# Patient Record
Sex: FEMALE | Race: BLACK OR AFRICAN AMERICAN | NOT HISPANIC OR LATINO | URBAN - METROPOLITAN AREA
[De-identification: names, ages, dates, MRNs, and addresses within clinical notes are randomized per-mention and may not be internally consistent; named-entity substitution may affect disease eponyms.]

---

## 2020-10-17 ENCOUNTER — INPATIENT (INPATIENT)
Facility: HOSPITAL | Age: 70
LOS: 2 days | Discharge: HOME | End: 2020-10-20
Attending: INTERNAL MEDICINE | Admitting: INTERNAL MEDICINE
Payer: MEDICAID

## 2020-10-17 VITALS
SYSTOLIC BLOOD PRESSURE: 159 MMHG | DIASTOLIC BLOOD PRESSURE: 67 MMHG | TEMPERATURE: 98 F | OXYGEN SATURATION: 99 % | RESPIRATION RATE: 20 BRPM | HEART RATE: 90 BPM

## 2020-10-17 LAB
ALBUMIN SERPL ELPH-MCNC: 4.5 G/DL — SIGNIFICANT CHANGE UP (ref 3.5–5.2)
ALP SERPL-CCNC: 61 U/L — SIGNIFICANT CHANGE UP (ref 30–115)
ALT FLD-CCNC: 25 U/L — SIGNIFICANT CHANGE UP (ref 0–41)
ANION GAP SERPL CALC-SCNC: 9 MMOL/L — SIGNIFICANT CHANGE UP (ref 7–14)
AST SERPL-CCNC: 26 U/L — SIGNIFICANT CHANGE UP (ref 0–41)
BASOPHILS # BLD AUTO: 0.04 K/UL — SIGNIFICANT CHANGE UP (ref 0–0.2)
BASOPHILS NFR BLD AUTO: 0.6 % — SIGNIFICANT CHANGE UP (ref 0–1)
BILIRUB SERPL-MCNC: 0.3 MG/DL — SIGNIFICANT CHANGE UP (ref 0.2–1.2)
BUN SERPL-MCNC: 14 MG/DL — SIGNIFICANT CHANGE UP (ref 10–20)
CALCIUM SERPL-MCNC: 9.1 MG/DL — SIGNIFICANT CHANGE UP (ref 8.5–10.1)
CHLORIDE SERPL-SCNC: 101 MMOL/L — SIGNIFICANT CHANGE UP (ref 98–110)
CO2 SERPL-SCNC: 25 MMOL/L — SIGNIFICANT CHANGE UP (ref 17–32)
CREAT SERPL-MCNC: 0.8 MG/DL — SIGNIFICANT CHANGE UP (ref 0.7–1.5)
EOSINOPHIL # BLD AUTO: 0.01 K/UL — SIGNIFICANT CHANGE UP (ref 0–0.7)
EOSINOPHIL NFR BLD AUTO: 0.1 % — SIGNIFICANT CHANGE UP (ref 0–8)
GLUCOSE SERPL-MCNC: 132 MG/DL — HIGH (ref 70–99)
HCT VFR BLD CALC: 36.6 % — LOW (ref 37–47)
HGB BLD-MCNC: 12 G/DL — SIGNIFICANT CHANGE UP (ref 12–16)
IMM GRANULOCYTES NFR BLD AUTO: 0.4 % — HIGH (ref 0.1–0.3)
LYMPHOCYTES # BLD AUTO: 1.14 K/UL — LOW (ref 1.2–3.4)
LYMPHOCYTES # BLD AUTO: 15.9 % — LOW (ref 20.5–51.1)
MAGNESIUM SERPL-MCNC: 2.1 MG/DL — SIGNIFICANT CHANGE UP (ref 1.8–2.4)
MCHC RBC-ENTMCNC: 29 PG — SIGNIFICANT CHANGE UP (ref 27–31)
MCHC RBC-ENTMCNC: 32.8 G/DL — SIGNIFICANT CHANGE UP (ref 32–37)
MCV RBC AUTO: 88.4 FL — SIGNIFICANT CHANGE UP (ref 81–99)
MONOCYTES # BLD AUTO: 0.33 K/UL — SIGNIFICANT CHANGE UP (ref 0.1–0.6)
MONOCYTES NFR BLD AUTO: 4.6 % — SIGNIFICANT CHANGE UP (ref 1.7–9.3)
NEUTROPHILS # BLD AUTO: 5.64 K/UL — SIGNIFICANT CHANGE UP (ref 1.4–6.5)
NEUTROPHILS NFR BLD AUTO: 78.4 % — HIGH (ref 42.2–75.2)
NRBC # BLD: 0 /100 WBCS — SIGNIFICANT CHANGE UP (ref 0–0)
NT-PROBNP SERPL-SCNC: 62 PG/ML — SIGNIFICANT CHANGE UP (ref 0–300)
PHOSPHATE SERPL-MCNC: 3.5 MG/DL — SIGNIFICANT CHANGE UP (ref 2.1–4.9)
PLATELET # BLD AUTO: 224 K/UL — SIGNIFICANT CHANGE UP (ref 130–400)
POTASSIUM SERPL-MCNC: 4.2 MMOL/L — SIGNIFICANT CHANGE UP (ref 3.5–5)
POTASSIUM SERPL-SCNC: 4.2 MMOL/L — SIGNIFICANT CHANGE UP (ref 3.5–5)
PROT SERPL-MCNC: 7.4 G/DL — SIGNIFICANT CHANGE UP (ref 6–8)
RBC # BLD: 4.14 M/UL — LOW (ref 4.2–5.4)
RBC # FLD: 12.4 % — SIGNIFICANT CHANGE UP (ref 11.5–14.5)
SODIUM SERPL-SCNC: 135 MMOL/L — SIGNIFICANT CHANGE UP (ref 135–146)
TROPONIN T SERPL-MCNC: <0.01 NG/ML — SIGNIFICANT CHANGE UP
WBC # BLD: 7.19 K/UL — SIGNIFICANT CHANGE UP (ref 4.8–10.8)
WBC # FLD AUTO: 7.19 K/UL — SIGNIFICANT CHANGE UP (ref 4.8–10.8)

## 2020-10-17 PROCEDURE — 71045 X-RAY EXAM CHEST 1 VIEW: CPT | Mod: 26

## 2020-10-17 PROCEDURE — 93010 ELECTROCARDIOGRAM REPORT: CPT

## 2020-10-17 PROCEDURE — 99284 EMERGENCY DEPT VISIT MOD MDM: CPT

## 2020-10-17 RX ORDER — ENOXAPARIN SODIUM 100 MG/ML
40 INJECTION SUBCUTANEOUS DAILY
Refills: 0 | Status: DISCONTINUED | OUTPATIENT
Start: 2020-10-17 | End: 2020-10-20

## 2020-10-17 NOTE — ED PROVIDER NOTE - OBJECTIVE STATEMENT
pt with pmhx htn and hyperchol presents to ED c/o intermittent palpitations for months. sts worse more recently as occurring more frequently and prevents pt from sleeping well. called cardio for an appt, but cannot be seen until next month. denies smoking/etoh/drug use. Denies fever/chill/HA/dizziness/chest pain/sob/abd pain/n/v/d/ black stool/bloody stool/urinary sxs

## 2020-10-17 NOTE — H&P ADULT - ATTENDING COMMENTS
HPI:  70F with PMH HTN, HLD presents to [facility] for palpitations exacerbated with exertion which have been progressively worsening for 1month. For the last week she has felt increasingly dizzy and generalized weakness. Reports an episode of vomiting today nbnb. She saw her PCP who gave her f/u with EP in December. She denies CP, SOB, diarrhea, bloody or melanotic stool, weight loss. Per pt palpitations have been getting more frequent , in the ED telemetry monitor consistent with frequent PVC's.  (17 Oct 2020 23:12)    REVIEW OF SYSTEMS: see cc/HPI   CONSTITUTIONAL: No weakness, fevers or chills  EYES/ENT: No visual changes;  No vertigo or throat pain   NECK: No pain or stiffness  RESPIRATORY: No cough, wheezing, hemoptysis; No shortness of breath  CARDIOVASCULAR: No chest pain (+) palpitations  GASTROINTESTINAL: No abdominal or epigastric pain. No nausea, vomiting, or hematemesis; No diarrhea or constipation. No melena or hematochezia.  GENITOURINARY: No dysuria, frequency or hematuria  NEUROLOGICAL: No numbness or weakness  SKIN: No itching, rashes HPI:  70F with PMH HTN, HLD presents to [facility] for palpitations exacerbated with exertion which have been progressively worsening for 1month. For the last week she has felt increasingly dizzy and generalized weakness. Reports an episode of vomiting today nbnb. She saw her PCP who gave her f/u with EP in December. She denies CP, SOB, diarrhea, bloody or melanotic stool, weight loss. Per pt palpitations have been getting more frequent , in the ED telemetry monitor consistent with frequent PVC's.  (17 Oct 2020 23:12)    REVIEW OF SYSTEMS: see cc/HPI   CONSTITUTIONAL: No weakness, fevers or chills  EYES/ENT: No visual changes;  No vertigo or throat pain   NECK: No pain or stiffness  RESPIRATORY: No cough, wheezing, hemoptysis; No shortness of breath  CARDIOVASCULAR: No chest pain (+) palpitations  GASTROINTESTINAL: No abdominal or epigastric pain. No nausea, vomiting, or hematemesis; No diarrhea or constipation. No melena or hematochezia.  GENITOURINARY: No dysuria, frequency or hematuria  NEUROLOGICAL: No numbness or weakness  SKIN: No itching, rashes  Physical Exam:    T(C): 36.9 (10-18-20 @ 04:09), Max: 36.9 (10-18-20 @ 04:09)  HR: 84 (10-18-20 @ 04:09) (80 - 90)  BP: 137/71 (10-18-20 @ 04:09) (137/71 - 159/67)  RR: 16 (10-18-20 @ 04:09) (16 - 20)  SpO2: 99% (10-18-20 @ 04:09) (99% - 99%)    General: WN/WD NAD  Neurology: A&Ox3, nonfocal, follows commands  Eyes: PERRLA/ EOMI  ENT/Neck: Neck supple, trachea midline, No JVD  Respiratory: CTA B/L, No wheezing, rales, rhonchi  CV: Normal rate regular rhythm, S1S2, no murmurs, rubs or gallops  Abdominal: Soft, NT, ND +BS,   Extremities: No edema, + peripheral pulses  Skin: No Rashes, Hematoma, Ecchymosis  Incisions: n/a  Tubes: n/a    A/P  Palpitation r/o arrhythmia   -Admit to tele   -serial CE and EKG   - check TSH,   -Agree w/ 2D echo for now  -EP eval - may need ILR    HTN - elevated BP in ED   -c/w current outpatient Rx     Dyslipidemia   -statin     DVT prophylaxis

## 2020-10-17 NOTE — ED ADULT NURSE NOTE - OBJECTIVE STATEMENT
Pt complaining of sob over the last few months. Dizziness. Pt complaining of sob over the last few months. Dizziness. Pt reports of feeling of palpitations for a few months but is unable to get an appointment for cardio until November. Pt reports she is also having difficulty sleeping.

## 2020-10-17 NOTE — H&P ADULT - HISTORY OF PRESENT ILLNESS
70F with PMH HTN, HLD presents to [facility] for palpitations exacerbated with exertion which have been progressively worsening for 1month. For the last week she has felt increasingly dizzy and generalized weakness. Reports an episode of vomiting today nbnb. She saw her PCP who gave her f/u with EP in December. She denies CP, SOB, diarrhea, bloody or melanotic stool, weight loss. Per pt palpitations have been getting more frequent , in the ED telemetry monitor consistent with frequent PVC's.

## 2020-10-17 NOTE — H&P ADULT - NSHPLABSRESULTS_GEN_ALL_CORE
CBC Full  -  ( 17 Oct 2020 21:35 )  WBC Count : 7.19 K/uL  RBC Count : 4.14 M/uL  Hemoglobin : 12.0 g/dL  Hematocrit : 36.6 %  Platelet Count - Automated : 224 K/uL  Mean Cell Volume : 88.4 fL  Mean Cell Hemoglobin : 29.0 pg  Mean Cell Hemoglobin Concentration : 32.8 g/dL  Auto Neutrophil # : 5.64 K/uL  Auto Lymphocyte # : 1.14 K/uL  Auto Monocyte # : 0.33 K/uL  Auto Eosinophil # : 0.01 K/uL  Auto Basophil # : 0.04 K/uL  Auto Neutrophil % : 78.4 %  Auto Lymphocyte % : 15.9 %  Auto Monocyte % : 4.6 %  Auto Eosinophil % : 0.1 %  Auto Basophil % : 0.6 %

## 2020-10-17 NOTE — ED PROVIDER NOTE - CLINICAL SUMMARY MEDICAL DECISION MAKING FREE TEXT BOX
70F with PMH HTN, HLD presents to [facility] for palpitations exacerbated with exertion which have been progressively worsening [id number]. For the last week she has felt increasingly dizzy and generalized weakness. Reports an episode of vomiting today nbnb. She saw her PCP who gave her f/u with EP in December. She denies CP, SOB, diarrhea, bloody or melanotic stool, weight loss. On eval pt in NAD, occasional irregular grouped beats on auscultation, lungs ctab, abd soft, nt, neuro exam non-focal, pt can do finger to nose, steady gait. EKG nsr, mag, phos, trop, BNP wnl. Given new dizziness/weakness, will admit for EP eval and further management.

## 2020-10-17 NOTE — H&P ADULT - ASSESSMENT
70F with PMH HTN, HLD presents to [facility] for palpitations exacerbated with exertion which have been progressively worsening for 1month. For the last week she has felt increasingly dizzy and generalized weakness. Reports an episode of vomiting today nbnb. She saw her PCP who gave her f/u with EP in December. She denies CP, SOB, diarrhea, bloody or melanotic stool, weight loss. Per pt palpitations have been getting more frequent , in the ED telemetry monitor consistent with frequent PVC's.     #) Palpitations   - admit to telemetry  - f/u electrolytes and correct  - f/u TSH  - ECHO  - Pt will eventually require ischemic workup , per cardio  - consider EP eval    #) HTN  - Can use amlodipine in house    #) DLD  - continue statin    #) Diet Dash    #) DVT PPX Lovenox     Dispo Acute   70F with PMH HTN, HLD presents to [facility] for palpitations exacerbated with exertion which have been progressively worsening for 1month. For the last week she has felt increasingly dizzy and generalized weakness. Reports an episode of vomiting today nbnb. She saw her PCP who gave her f/u with EP in December. She denies CP, SOB, diarrhea, bloody or melanotic stool, weight loss. Per pt palpitations have been getting more frequent , in the ED telemetry monitor consistent with frequent PVC's.     #) Palpitations / Multiple PVC   - admit to telemetry  - f/u electrolytes and correct  - f/u TSH  - ECHO  - Pt will eventually require ischemic workup , per cardio  - consider EP eval    #) HTN  - Can use amlodipine in house    #) DLD  - continue statin    #) Diet Dash    #) DVT PPX Lovenox     Dispo Acute

## 2020-10-17 NOTE — ED ADULT NURSE NOTE - NSIMPLEMENTINTERV_GEN_ALL_ED
Implemented All Universal Safety Interventions:  [place] to call system. Call bell, personal items and telephone within reach. Instruct patient to call for assistance. Room bathroom lighting operational. Non-slip footwear when patient is off stretcher. Physically safe environment: no spills, clutter or unnecessary equipment. Stretcher in lowest position, wheels locked, appropriate side rails in place.

## 2020-10-18 LAB
ANION GAP SERPL CALC-SCNC: 9 MMOL/L — SIGNIFICANT CHANGE UP (ref 7–14)
BUN SERPL-MCNC: 12 MG/DL — SIGNIFICANT CHANGE UP (ref 10–20)
CALCIUM SERPL-MCNC: 10 MG/DL — SIGNIFICANT CHANGE UP (ref 8.5–10.1)
CHLORIDE SERPL-SCNC: 106 MMOL/L — SIGNIFICANT CHANGE UP (ref 98–110)
CO2 SERPL-SCNC: 24 MMOL/L — SIGNIFICANT CHANGE UP (ref 17–32)
CREAT SERPL-MCNC: 0.7 MG/DL — SIGNIFICANT CHANGE UP (ref 0.7–1.5)
GLUCOSE SERPL-MCNC: 90 MG/DL — SIGNIFICANT CHANGE UP (ref 70–99)
HCT VFR BLD CALC: 37.3 % — SIGNIFICANT CHANGE UP (ref 37–47)
HGB BLD-MCNC: 12.2 G/DL — SIGNIFICANT CHANGE UP (ref 12–16)
MAGNESIUM SERPL-MCNC: 2.2 MG/DL — SIGNIFICANT CHANGE UP (ref 1.8–2.4)
MCHC RBC-ENTMCNC: 29.3 PG — SIGNIFICANT CHANGE UP (ref 27–31)
MCHC RBC-ENTMCNC: 32.7 G/DL — SIGNIFICANT CHANGE UP (ref 32–37)
MCV RBC AUTO: 89.4 FL — SIGNIFICANT CHANGE UP (ref 81–99)
NRBC # BLD: 0 /100 WBCS — SIGNIFICANT CHANGE UP (ref 0–0)
PLATELET # BLD AUTO: 221 K/UL — SIGNIFICANT CHANGE UP (ref 130–400)
POTASSIUM SERPL-MCNC: 4.2 MMOL/L — SIGNIFICANT CHANGE UP (ref 3.5–5)
POTASSIUM SERPL-SCNC: 4.2 MMOL/L — SIGNIFICANT CHANGE UP (ref 3.5–5)
RBC # BLD: 4.17 M/UL — LOW (ref 4.2–5.4)
RBC # FLD: 12.4 % — SIGNIFICANT CHANGE UP (ref 11.5–14.5)
SARS-COV-2 RNA SPEC QL NAA+PROBE: SIGNIFICANT CHANGE UP
SODIUM SERPL-SCNC: 139 MMOL/L — SIGNIFICANT CHANGE UP (ref 135–146)
WBC # BLD: 5.09 K/UL — SIGNIFICANT CHANGE UP (ref 4.8–10.8)
WBC # FLD AUTO: 5.09 K/UL — SIGNIFICANT CHANGE UP (ref 4.8–10.8)

## 2020-10-18 PROCEDURE — 99222 1ST HOSP IP/OBS MODERATE 55: CPT

## 2020-10-18 PROCEDURE — 93010 ELECTROCARDIOGRAM REPORT: CPT

## 2020-10-18 RX ORDER — ADENOSINE 3 MG/ML
60 INJECTION INTRAVENOUS ONCE
Refills: 0 | Status: DISCONTINUED | OUTPATIENT
Start: 2020-10-18 | End: 2020-10-18

## 2020-10-18 RX ORDER — REGADENOSON 0.08 MG/ML
0.4 INJECTION, SOLUTION INTRAVENOUS ONCE
Refills: 0 | Status: COMPLETED | OUTPATIENT
Start: 2020-10-18 | End: 2020-10-19

## 2020-10-18 RX ORDER — INFLUENZA VIRUS VACCINE 15; 15; 15; 15 UG/.5ML; UG/.5ML; UG/.5ML; UG/.5ML
0.5 SUSPENSION INTRAMUSCULAR ONCE
Refills: 0 | Status: COMPLETED | OUTPATIENT
Start: 2020-10-18 | End: 2020-10-20

## 2020-10-18 RX ADMIN — ENOXAPARIN SODIUM 40 MILLIGRAM(S): 100 INJECTION SUBCUTANEOUS at 11:18

## 2020-10-18 NOTE — CONSULT NOTE ADULT - SUBJECTIVE AND OBJECTIVE BOX
Patient is a 70y old  Female who presents with a chief complaint of Palpiations (17 Oct 2020 23:12)    HPI: Patient is a 69 yo F with hx of HLD c/o palpitations for the past month. Pt reports symptoms have been intermittent but getting progressively worse and more frequent to the point that she is having difficulty sleeping at night. She went to her PCP who set her up with EP appointment in December (patient unsure with who) but states she could not wait until then. Patient admits to episodes of dizziness and weakness over the past week, no LOC. She also had episode of N/V last night which prompted her to come to the ED. Pt reports she had a fall last year 2/2 syncope and was evaluated by a cardiologist at another hospital but was cleared and has not since followed up with anyone. No CP, SOB.      PAST MEDICAL & SURGICAL HISTORY:  Hypercholesteremia    HTN (hypertension)    PREVIOUS DIAGNOSTIC TESTING:      ECHO  FINDINGS:    STRESS  FINDINGS:    CATHETERIZATION  FINDINGS:    ELECTROPHYSIOLOGY STUDY  FINDINGS:    CAROTID ULTRASOUND:  FINDINGS    VENOUS DUPLEX SCAN:  FINDINGS:    CHEST CT PULMONARY ANGIO with IV Contrast:  FINDINGS:    MEDICATIONS  (STANDING):  enoxaparin Injectable 40 milliGRAM(s) SubCutaneous daily  influenza   Vaccine 0.5 milliLiter(s) IntraMuscular once    MEDICATIONS  (PRN):      FAMILY HISTORY: No significant hx    SOCIAL HISTORY: No smoking, ETOH or illicit drug use    Past Surgical History: No significant hx    Allergies:  Allergy Status Unknown      REVIEW OF SYSTEMS:  CONSTITUTIONAL: No fever, weight loss, chills, shakes, or fatigue  RESPIRATORY: No cough, wheezing, hemoptysis, or shortness of breath  CARDIOVASCULAR: +Palpitations; No chest pain, dyspnea, syncope, paroxysmal nocturnal dyspnea, orthopnea, or arm or leg swelling  GASTROINTESTINAL: No abdominal  or epigastric pain, nausea, vomiting, hematemesis, diarrhea, constipation, melena or bright red blood.  NEUROLOGICAL: +Dizziness; No headaches, memory loss, slurred speech, limb weakness, loss of strength, numbness, or tremors  MUSCULOSKELETAL: No joint pain or swelling, muscle, back, or extremity pain      Vital Signs Last 24 Hrs  T(C): 36.8 (18 Oct 2020 07:46), Max: 36.9 (18 Oct 2020 04:09)  T(F): 98.2 (18 Oct 2020 07:46), Max: 98.4 (18 Oct 2020 04:09)  HR: 63 (18 Oct 2020 07:46) (63 - 90)  BP: 118/62 (18 Oct 2020 07:46) (118/62 - 159/67)  BP(mean): --  RR: 17 (18 Oct 2020 07:46) (16 - 20)  SpO2: 100% (18 Oct 2020 07:46) (99% - 100%)    PHYSICAL EXAM:  GENERAL: In no apparent distress, well nourished, and hydrated.  HEAD:  Atraumatic, Normocephalic  EYES: EOMI, PERRLA, conjunctiva and sclera clear  ENMT: No tonsillar erythema, exudates, or enlargements; ist mucous membranes, Good dentition, No lesions  NECK: Supple and normal thyroid.  No JVD or carotid bruit.  Carotid pulse is 2+ bilaterally.  HEART: Regular rate and rhythm; No murmurs, rubs, or gallops.  PULMONARY: Clear to auscultation and perfusion.  No rales, wheezing, or rhonchi bilaterally.  ABDOMEN: Soft, Nontender, Nondistended; Bowel sounds present  EXTREMITIES:  2+ Peripheral Pulses, No clubbing, cyanosis, or edema  NEUROLOGICAL: Grossly nonfocal      INTERPRETATION OF TELEMETRY: NSR 80 bpm, few PVCs/PACs    ECG:  < from: 12 Lead ECG (10.17.20 @ 20:59) >  Ventricular Rate 74 BPM    Atrial Rate 74 BPM    P-R Interval 142 ms    QRS Duration 76 ms    Q-T Interval 374 ms    QTC Calculation(Bazett) 415 ms    P Axis 66 degrees    R Axis 21 degrees    T Axis 39 degrees    Diagnosis Line Normal sinus rhythm  Nonspecific ST-T changes  Abnormal ECG    Confirmed by Asim Don ([id number]) on 10/18/2020 7:32:47 AM    < end of copied text >      I&O's Detail      LABS:                        12.2   5.09  )-----------( 221      ( 18 Oct 2020 05:20 )             37.3     10-18    139  |  106  |  12  ----------------------------<  90  4.2   |  24  |  0.7    Ca    10.0      18 Oct 2020 05:20  Phos  3.5     10-17  Mg     2.2     10-18    TPro  7.4  /  Alb  4.5  /  TBili  0.3  /  DBili  x   /  AST  26  /  ALT  25  /  AlkPhos  61  10-17    CARDIAC MARKERS ( 17 Oct 2020 21:35 )  x     / <0.01 ng/mL / x     / x     / x              BNPSerum Pro-Brain Natriuretic Peptide: 62 pg/mL (10-17 @ 21:35)    I&O's Detail    Daily     Daily     RADIOLOGY & ADDITIONAL STUDIES:    < from: Xray Chest 1 View AP/PA (10.17.20 @ 22:04) >    EXAM:  XR CHEST FRONTAL 1V            PROCEDURE DATE:  10/17/2020            INTERPRETATION:  Clinical History / Reason for exam: Palpitations    Comparison : Chest radiograph None.    Technique/Positioning: Adequate.    Findings:    Support devices: None.    Cardiac/mediastinum/hilum: Unremarkable.    Lung parenchyma/Pleura: Within normal limits.    Skeleton/soft tissues: Unremarkable.    Impression:    No radiographic evidence of acute cardiopulmonary disease.                ANGELICA ALMANZAR M.D., ATTENDING RADIOLOGIST  This document has been electronically signed. Oct 18 2020  9:32AM    < end of copied text >

## 2020-10-18 NOTE — PROGRESS NOTE ADULT - SUBJECTIVE AND OBJECTIVE BOX
Patient is a 70y old  Female who presents with a chief complaint of Palpiations (18 Oct 2020 09:48)      OVERNIGHT EVENTS: no acute events overnight     SUBJECTIVE / INTERVAL HPI: Patient seen and examined at bedside. no chest pain, no sob, no syncope, no event on telemetry     VITAL SIGNS:  Vital Signs Last 24 Hrs  T(C): 36.8 (18 Oct 2020 07:46), Max: 36.9 (18 Oct 2020 04:09)  T(F): 98.2 (18 Oct 2020 07:46), Max: 98.4 (18 Oct 2020 04:09)  HR: 63 (18 Oct 2020 07:46) (63 - 90)  BP: 118/62 (18 Oct 2020 07:46) (118/62 - 159/67)  BP(mean): --  RR: 17 (18 Oct 2020 07:46) (16 - 20)  SpO2: 100% (18 Oct 2020 07:46) (99% - 100%)    PHYSICAL EXAM:    General: WDWN  HEENT: NC/AT; PERRL, clear conjunctiva  Neck: supple  Cardiovascular: RRR,  Respiratory: Clear breath sounds on anterior auscultation   Gastrointestinal: soft, NT/ND; +BSx4  Extremities: WWP; 2+ peripheral pulses; no edema   Neurological: AAOx3; no focal deficits    MEDICATIONS:  MEDICATIONS  (STANDING):  enoxaparin Injectable 40 milliGRAM(s) SubCutaneous daily  influenza   Vaccine 0.5 milliLiter(s) IntraMuscular once    MEDICATIONS  (PRN):      ALLERGIES:  Allergies    Allergy Status Unknown    Intolerances        LABS:                        12.2   5.09  )-----------( 221      ( 18 Oct 2020 05:20 )             37.3     10-18    139  |  106  |  12  ----------------------------<  90  4.2   |  24  |  0.7    Ca    10.0      18 Oct 2020 05:20  Phos  3.5     10-17  Mg     2.2     10-18    TPro  7.4  /  Alb  4.5  /  TBili  0.3  /  DBili  x   /  AST  26  /  ALT  25  /  AlkPhos  61  10-17        CAPILLARY BLOOD GLUCOSE

## 2020-10-18 NOTE — CONSULT NOTE ADULT - ATTENDING COMMENTS
patient-unknown etiology of palpitations at this time.  -Recommend event monitor for 30 days  -Echo  -Nuclear stress test

## 2020-10-18 NOTE — CONSULT NOTE ADULT - ASSESSMENT
Assessment: 71 yo F with HLD admitted with palpitations x 1 month. Pt admits to dizziness and weakness, no LOC. Patient found to have occasional PVCs on tele monitor.    Impression:  Palpitations  HLD    Plan:  - Offered patient loop recorder to r/o arrhythmia, says she will discuss with her family and let us know  - If patient agreeable, will plan for loop implant tomorrow in EP lab. Pt does NOT need to be NPO for procedure  - Check 2D Echo  - Check TSH, free T4  - Cont tele monitoring  - Monitor electrolytes, maintain WNL  - Will follow

## 2020-10-18 NOTE — PROGRESS NOTE ADULT - ASSESSMENT
70F with PMH HTN, HLD presents to [facility] for palpitations exacerbated with exertion which have been progressively worsening for 1 month. For the last week she has felt increasingly dizzy and generalized weakness. Reports an episode of vomiting today nbnb. She saw her PCP who gave her f/u with EP in December. She denies CP, SOB, diarrhea, bloody or melanotic stool, weight loss. Per pt palpitations have been getting more frequent , in the ED telemetry monitor consistent with frequent PVC's.     #) Palpitations / Multiple PVC   - admit to telemetry  - f/u electrolytes and correct  - f/u TSH  - ECHO  - Pt will eventually require ischemic workup , per cardio  - consider EP eval    #) HTN  - Can use amlodipine in house    #) DLD  - continue statin    #) Diet Dash    #) DVT PPX Lovenox     Dispo Acute   70F with PMH HTN, HLD presents to [facility] for palpitations exacerbated with exertion which have been progressively worsening for 1 month. For the last week she has felt increasingly dizzy and generalized weakness.    #) Palpitations  - with occasional PVC on monitor   - admit to telemetry  - f/u electrolytes and correct  - f/u TSH  - ECHO to assess for LVEF   - EP : ILR to assess PVC burden. if > 10% over 24 hrs ON HOLTER  monitoring, may need ablation to prevent cardiomyopathy     #) HTN  - Controlled    #) DLD  - continue statin    #) Diet Dash    #) DVT PPX Lovenox   # )ppi prophylaxis : no need  # ) full code  Dispo can be d/c with a plan to f/u as OP with EP

## 2020-10-19 ENCOUNTER — TRANSCRIPTION ENCOUNTER (OUTPATIENT)
Age: 70
End: 2020-10-19

## 2020-10-19 PROBLEM — I10 ESSENTIAL (PRIMARY) HYPERTENSION: Chronic | Status: ACTIVE | Noted: 2020-10-17

## 2020-10-19 PROBLEM — E78.00 PURE HYPERCHOLESTEROLEMIA, UNSPECIFIED: Chronic | Status: ACTIVE | Noted: 2020-10-17

## 2020-10-19 LAB
ANION GAP SERPL CALC-SCNC: 13 MMOL/L — SIGNIFICANT CHANGE UP (ref 7–14)
BASOPHILS # BLD AUTO: 0.04 K/UL — SIGNIFICANT CHANGE UP (ref 0–0.2)
BASOPHILS NFR BLD AUTO: 1.2 % — HIGH (ref 0–1)
BUN SERPL-MCNC: 17 MG/DL — SIGNIFICANT CHANGE UP (ref 10–20)
CALCIUM SERPL-MCNC: 9 MG/DL — SIGNIFICANT CHANGE UP (ref 8.5–10.1)
CHLORIDE SERPL-SCNC: 108 MMOL/L — SIGNIFICANT CHANGE UP (ref 98–110)
CO2 SERPL-SCNC: 20 MMOL/L — SIGNIFICANT CHANGE UP (ref 17–32)
CREAT SERPL-MCNC: 0.8 MG/DL — SIGNIFICANT CHANGE UP (ref 0.7–1.5)
EOSINOPHIL # BLD AUTO: 0.1 K/UL — SIGNIFICANT CHANGE UP (ref 0–0.7)
EOSINOPHIL NFR BLD AUTO: 3 % — SIGNIFICANT CHANGE UP (ref 0–8)
GLUCOSE SERPL-MCNC: 76 MG/DL — SIGNIFICANT CHANGE UP (ref 70–99)
HCT VFR BLD CALC: 37.1 % — SIGNIFICANT CHANGE UP (ref 37–47)
HCV AB S/CO SERPL IA: 0.04 COI — SIGNIFICANT CHANGE UP
HCV AB SERPL-IMP: SIGNIFICANT CHANGE UP
HGB BLD-MCNC: 12 G/DL — SIGNIFICANT CHANGE UP (ref 12–16)
IMM GRANULOCYTES NFR BLD AUTO: 0.3 % — SIGNIFICANT CHANGE UP (ref 0.1–0.3)
LYMPHOCYTES # BLD AUTO: 1.53 K/UL — SIGNIFICANT CHANGE UP (ref 1.2–3.4)
LYMPHOCYTES # BLD AUTO: 46.1 % — SIGNIFICANT CHANGE UP (ref 20.5–51.1)
MAGNESIUM SERPL-MCNC: 2.2 MG/DL — SIGNIFICANT CHANGE UP (ref 1.8–2.4)
MCHC RBC-ENTMCNC: 29.1 PG — SIGNIFICANT CHANGE UP (ref 27–31)
MCHC RBC-ENTMCNC: 32.3 G/DL — SIGNIFICANT CHANGE UP (ref 32–37)
MCV RBC AUTO: 90 FL — SIGNIFICANT CHANGE UP (ref 81–99)
MONOCYTES # BLD AUTO: 0.48 K/UL — SIGNIFICANT CHANGE UP (ref 0.1–0.6)
MONOCYTES NFR BLD AUTO: 14.5 % — HIGH (ref 1.7–9.3)
NEUTROPHILS # BLD AUTO: 1.16 K/UL — LOW (ref 1.4–6.5)
NEUTROPHILS NFR BLD AUTO: 34.9 % — LOW (ref 42.2–75.2)
NRBC # BLD: 0 /100 WBCS — SIGNIFICANT CHANGE UP (ref 0–0)
PLATELET # BLD AUTO: 201 K/UL — SIGNIFICANT CHANGE UP (ref 130–400)
POTASSIUM SERPL-MCNC: 4.6 MMOL/L — SIGNIFICANT CHANGE UP (ref 3.5–5)
POTASSIUM SERPL-SCNC: 4.6 MMOL/L — SIGNIFICANT CHANGE UP (ref 3.5–5)
RBC # BLD: 4.12 M/UL — LOW (ref 4.2–5.4)
RBC # FLD: 12.5 % — SIGNIFICANT CHANGE UP (ref 11.5–14.5)
SODIUM SERPL-SCNC: 141 MMOL/L — SIGNIFICANT CHANGE UP (ref 135–146)
TSH SERPL-MCNC: 2.18 UIU/ML — SIGNIFICANT CHANGE UP (ref 0.27–4.2)
TSH SERPL-MCNC: 3.19 UIU/ML — SIGNIFICANT CHANGE UP (ref 0.27–4.2)
WBC # BLD: 3.32 K/UL — LOW (ref 4.8–10.8)
WBC # FLD AUTO: 3.32 K/UL — LOW (ref 4.8–10.8)

## 2020-10-19 PROCEDURE — 93016 CV STRESS TEST SUPVJ ONLY: CPT

## 2020-10-19 PROCEDURE — 93018 CV STRESS TEST I&R ONLY: CPT

## 2020-10-19 PROCEDURE — 93306 TTE W/DOPPLER COMPLETE: CPT | Mod: 26

## 2020-10-19 PROCEDURE — 78452 HT MUSCLE IMAGE SPECT MULT: CPT | Mod: 26

## 2020-10-19 PROCEDURE — 99233 SBSQ HOSP IP/OBS HIGH 50: CPT

## 2020-10-19 RX ORDER — ATORVASTATIN CALCIUM 80 MG/1
10 TABLET, FILM COATED ORAL AT BEDTIME
Refills: 0 | Status: DISCONTINUED | OUTPATIENT
Start: 2020-10-19 | End: 2020-10-20

## 2020-10-19 RX ORDER — CHOLECALCIFEROL (VITAMIN D3) 125 MCG
1 CAPSULE ORAL
Qty: 0 | Refills: 0 | DISCHARGE

## 2020-10-19 RX ORDER — AMLODIPINE BESYLATE 2.5 MG/1
1 TABLET ORAL
Qty: 0 | Refills: 0 | DISCHARGE

## 2020-10-19 RX ORDER — AMLODIPINE BESYLATE 2.5 MG/1
5 TABLET ORAL DAILY
Refills: 0 | Status: DISCONTINUED | OUTPATIENT
Start: 2020-10-19 | End: 2020-10-20

## 2020-10-19 RX ORDER — ASPIRIN/CALCIUM CARB/MAGNESIUM 324 MG
81 TABLET ORAL DAILY
Refills: 0 | Status: DISCONTINUED | OUTPATIENT
Start: 2020-10-19 | End: 2020-10-20

## 2020-10-19 RX ORDER — ASPIRIN/CALCIUM CARB/MAGNESIUM 324 MG
1 TABLET ORAL
Qty: 0 | Refills: 0 | DISCHARGE

## 2020-10-19 RX ADMIN — ENOXAPARIN SODIUM 40 MILLIGRAM(S): 100 INJECTION SUBCUTANEOUS at 14:09

## 2020-10-19 RX ADMIN — AMLODIPINE BESYLATE 5 MILLIGRAM(S): 2.5 TABLET ORAL at 05:44

## 2020-10-19 RX ADMIN — REGADENOSON 0.4 MILLIGRAM(S): 0.08 INJECTION, SOLUTION INTRAVENOUS at 10:38

## 2020-10-19 RX ADMIN — ATORVASTATIN CALCIUM 10 MILLIGRAM(S): 80 TABLET, FILM COATED ORAL at 21:44

## 2020-10-19 NOTE — CHART NOTE - NSCHARTNOTEFT_GEN_A_CORE
Biotel event monitor placed on pt.  She will follow up with Dr. Torres as an outpatient in 6 weeks.  Please recall EP as needed

## 2020-10-19 NOTE — PROGRESS NOTE ADULT - ASSESSMENT
70F with PMH HTN, HLD presents to [facility] for palpitations exacerbated with exertion which have been progressively worsening for 1 month. For the last week she has felt increasingly dizzy and generalized weakness.    #) Palpitations  - with occasional PVC on monitor   - admit to telemetry  - f/u electrolytes and correct  - f/u TSH  - ECHO to assess for LVEF   - EP: ILR to assess PVC burden. if > 10% over 24 hrs ON HOLTER  monitoring, may need ablation to prevent cardiomyopathy. EP plan to place event monitor.  - Negative stress test    #) HTN  - Controlled    #) DLD  - continue statin    #) Diet Dash    #) DVT PPX Lovenox   # )ppi prophylaxis : no need  # ) full code    Ok to d/c after event monitor placement per EP.

## 2020-10-19 NOTE — PROGRESS NOTE ADULT - SUBJECTIVE AND OBJECTIVE BOX
SUBJECTIVE:    Patient is a 70y old Female who presents with a chief complaint of Palpiations (19 Oct 2020 07:26)    Currently admitted to medicine with the primary diagnosis of Palpitations     Today is hospital day 2d. This morning she is resting comfortably in bed and reports no new issues or overnight events.     Admit Diagnosis:  PALPITATIONS        PAST MEDICAL & SURGICAL HISTORY  Hypercholesteremia    HTN (hypertension)    Hypercholesteremia    HTN (hypertension)        SOCIAL HISTORY:  Negative for smoking/alcohol/drug use.     ALLERGIES:  Allergy Status Unknown    MEDICATIONS:  STANDING MEDICATIONS  amLODIPine   Tablet 5 milliGRAM(s) Oral daily  aspirin  chewable 81 milliGRAM(s) Oral daily  atorvastatin 10 milliGRAM(s) Oral at bedtime  enoxaparin Injectable 40 milliGRAM(s) SubCutaneous daily  influenza   Vaccine 0.5 milliLiter(s) IntraMuscular once  regadenoson Injectable 0.4 milliGRAM(s) IV Push once    PRN MEDICATIONS    VITALS:   T(F): 96.1  HR: 62  BP: 115/73  RR: 18  SpO2: 100%    I&Os:    PHYSICAL EXAM:  GEN: No acute distress  LUNGS: Clear to auscultation bilaterally   HEART: S1/S2 present. RRR.   ABD: Soft, NT/ND. BS +  EXT: no cyanosis/edema  NEURO: AAOX3    LABS:                        12.0   3.32  )-----------( 201      ( 19 Oct 2020 05:58 )             37.1     10-19    141  |  108  |  17  ----------------------------<  76  4.6   |  20  |  0.8    Ca    9.0      19 Oct 2020 05:58  Phos  3.5     10-17  Mg     2.2     10-19    TPro  7.4  /  Alb  4.5  /  TBili  0.3  /  DBili  x   /  AST  26  /  ALT  25  /  AlkPhos  61  10-17      CARDIAC MARKERS ( 17 Oct 2020 21:35 )  x     / <0.01 ng/mL / x     / x     / x          < from: 12 Lead ECG (10.17.20 @ 20:59) >  Diagnosis Line Normal sinus rhythm  Nonspecific ST-T changes  Abnormal ECG    < end of copied text >    RADIOLOGY:  < from: NM Nuclear Stress Pharmacologic Multiple (10.19.20 @ 12:04) >  Impression:  1. NORMAL LEXISCAN / REST MYOCARDIAL PERFUSION SPECT TOMOGRAPHY, WITH NO EVIDENCE FOR ISCHEMIA DURING LEXISCAN INFUSION.  2. NORMAL RESTING LEFT VENTRICULAR WALL MOTION AND WALL THICKENING.  3. LEFT VENTRICULAR EJECTION FRACTION OF  >80 % WHICH IS WITHIN RANGE OF NORMAL OR IN THE HYPERDYNAMIC RANGE.    < end of copied text >

## 2020-10-19 NOTE — DISCHARGE NOTE PROVIDER - NSDCCPCAREPLAN_GEN_ALL_CORE_FT
PRINCIPAL DISCHARGE DIAGNOSIS  Diagnosis: Palpitations  Assessment and Plan of Treatment: You presented with palpitations and were monitored on the cardiac telemtery floor where your heart rhythm was monitored. You had electrophysiologist consulted and were planned on placing an event recorder to find the cause of the palpitations. On the telemetry you had some premature beats but not significant enough for any intervention. Please follow with cardiologist in one week.  Palpitations  A palpitation is the feeling that your heartbeat is irregular or is faster than normal. It may feel like your heart is fluttering or skipping a beat. They may be caused by many things, including smoking, caffeine, alcohol, stress, and certain medicines. Although most causes of palpitations are not serious, palpitations can be a sign of a serious medical problem. Avoid caffeine, alcohol, and tobacco products at home. Try to reduce stress and anxiety and make sure to get plenty of rest.   SEEK IMMEDIATE MEDICAL CARE IF YOU HAVE ANY OF THE FOLLOWING SYMPTOMS: chest pain, shortness of breath, severe headache, dizziness/lightheadedness, or fainting.

## 2020-10-19 NOTE — DISCHARGE NOTE PROVIDER - CARE PROVIDER_API CALL
Juanjo Torres; [place])  Cardiac Electrophysiology  [address] [place] [address]  Phone: [phone]  Fax: [phone]  Follow Up Time: 1 week

## 2020-10-19 NOTE — PROGRESS NOTE ADULT - SUBJECTIVE AND OBJECTIVE BOX
EDILIA MENON  70y Female    CHIEF COMPLAINT:    Patient is a 70y old  Female who presents with a chief complaint of Palpiations (18 Oct 2020 11:57)      INTERVAL HPI/OVERNIGHT EVENTS:    Patient seen and examined.    ROS: All other systems are negative.    Vital Signs:    T(F): 96.1 (10-19-20 @ 05:52), Max: 98.4 (10-18-20 @ 16:45)  HR: 62 (10-19-20 @ 05:52) (62 - 68)  BP: 115/73 (10-19-20 @ 05:52) (107/63 - 118/62)  RR: 18 (10-19-20 @ 05:52) (17 - 18)  SpO2: 100% (10-18-20 @ 20:01) (100% - 100%)  I&O's Summary    Daily     Daily Weight in k.6 (19 Oct 2020 05:52)  CAPILLARY BLOOD GLUCOSE          PHYSICAL EXAM:    GENERAL:  NAD  SKIN: No rashes or lesions  HENT: Atrumatic. Normocephalic. PERRL. Moist membranes.  NECK: Supple, No JVD. No lymphadenopathy.  PULMONARY: CTA B/L. No wheezing. No rales  CVS: Normal S1, S2. Rate and Rythm are regular. No murmurs.  ABDOMEN/GI: Soft, Nontender, Nondistended; BS present  EXTREMITIES: Peripheral pulses intact. No edema B/L LE.  NEUROLOGIC:  No motor or sensory deficit.  PSYCH: Alert & oriented x 3    Consultant(s) Notes Reviewed:  [x ] YES  [ ] NO  Care Discussed with Consultants/Other Providers [ x] YES  [ ] NO    EKG reviewed  Telemetry reviewed    LABS:                        12.2   5.09  )-----------( 221      ( 18 Oct 2020 05:20 )             37.3     10-18    139  |  106  |  12  ----------------------------<  90  4.2   |  24  |  0.7    Ca    10.0      18 Oct 2020 05:20  Phos  3.5     10-17  Mg     2.2     10-18    TPro  7.4  /  Alb  4.5  /  TBili  0.3  /  DBili  x   /  AST  26  /  ALT  25  /  AlkPhos  61  10-17      Serum Pro-Brain Natriuretic Peptide: 62 pg/mL (10-17-20 @ 21:35)    Trop <0.01, CKMB --, CK --, 10-17-20 @ 21:35        RADIOLOGY & ADDITIONAL TESTS:      Imaging or report Personally Reviewed:  [ ] YES  [ ] NO    Medications:  Standing  amLODIPine   Tablet 5 milliGRAM(s) Oral daily  aspirin  chewable 81 milliGRAM(s) Oral daily  atorvastatin 10 milliGRAM(s) Oral at bedtime  enoxaparin Injectable 40 milliGRAM(s) SubCutaneous daily  influenza   Vaccine 0.5 milliLiter(s) IntraMuscular once  regadenoson Injectable 0.4 milliGRAM(s) IV Push once    PRN Meds      Case discussed with resident    Care discussed with pt/family           EDILIA MENON  70y Female    CHIEF COMPLAINT:    Patient is a 70y old  Female who presents with a chief complaint of Palpiations (18 Oct 2020 11:57)      INTERVAL HPI/OVERNIGHT EVENTS:    Patient seen and examined. C/O on & off palpitations and dizziness for the last one month. No cp. No sob.     ROS: All other systems are negative.    Vital Signs:    T(F): 96.1 (10-19-20 @ 05:52), Max: 98.4 (10-18-20 @ 16:45)  HR: 62 (10-19-20 @ 05:52) (62 - 68)  BP: 115/73 (10-19-20 @ 05:52) (107/63 - 118/62)  RR: 18 (10-19-20 @ 05:52) (17 - 18)  SpO2: 100% (10-18-20 @ 20:01) (100% - 100%)  I&O's Summary    Daily     Daily Weight in k.6 (19 Oct 2020 05:52)  CAPILLARY BLOOD GLUCOSE          PHYSICAL EXAM:    GENERAL:  NAD  SKIN: No rashes or lesions  HENT: Atraumatic Normocephalic. PERRL. Moist membranes.  NECK: Supple, No JVD. No lymphadenopathy.  PULMONARY: CTA B/L. No wheezing. No rales  CVS: Normal S1, S2. Rate and Rhythm are regular. No murmurs.  ABDOMEN/GI: Soft, Nontender, Nondistended; BS present  EXTREMITIES: Peripheral pulses intact. No edema B/L LE.  NEUROLOGIC:  No motor or sensory deficit.  PSYCH: Alert & oriented x 3    Consultant(s) Notes Reviewed:  [x ] YES  [ ] NO  Care Discussed with Consultants/Other Providers [ x] YES  [ ] NO    EKG reviewed  Telemetry reviewed    LABS:                        12.2   5.09  )-----------( 221      ( 18 Oct 2020 05:20 )             37.3     10-18    139  |  106  |  12  ----------------------------<  90  4.2   |  24  |  0.7    Ca    10.0      18 Oct 2020 05:20  Phos  3.5     10-17  Mg     2.2     10-18    TPro  7.4  /  Alb  4.5  /  TBili  0.3  /  DBili  x   /  AST  26  /  ALT  25  /  AlkPhos  61  10-17      Serum Pro-Brain Natriuretic Peptide: 62 pg/mL (10-17-20 @ 21:35)    Trop <0.01, CKMB --, CK --, 10-17-20 @ 21:35        RADIOLOGY & ADDITIONAL TESTS:      Imaging or report Personally Reviewed:  [ ] YES  [ ] NO    Medications:  Standing  amLODIPine   Tablet 5 milliGRAM(s) Oral daily  aspirin  chewable 81 milliGRAM(s) Oral daily  atorvastatin 10 milliGRAM(s) Oral at bedtime  enoxaparin Injectable 40 milliGRAM(s) SubCutaneous daily  influenza   Vaccine 0.5 milliLiter(s) IntraMuscular once  regadenoson Injectable 0.4 milliGRAM(s) IV Push once    PRN Meds      Case discussed with resident    Care discussed with pt/family

## 2020-10-19 NOTE — DISCHARGE NOTE PROVIDER - HOSPITAL COURSE
HPI:  70F with PMH HTN, HLD presents to [facility] for palpitations exacerbated with exertion which have been progressively worsening for 1month. For the last week she has felt increasingly dizzy and generalized weakness. Reports an episode of vomiting today nbnb. She saw her PCP who gave her f/u with EP in December. She denies CP, SOB, diarrhea, bloody or melanotic stool, weight loss. Per pt palpitations have been getting more frequent , in the ED telemetry monitor consistent with frequent PVC's.  (17 Oct 2020 23:12).    Patient had frequent PVCs on the tele monitoring and was continued to be monitored on telemetry. No arrythmia were noted. Patient had a nuclear scan done which was negative for any ischemic changes. Patient was seen by EP and the patient had an event recorder placed and will be discharged home today.  .

## 2020-10-19 NOTE — DISCHARGE NOTE PROVIDER - NSDCFUADDINST_GEN_ALL_CORE_FT
Please follow with your cardiologist as recommended.    Please avoid caffeine, alcohol and energy drinks.

## 2020-10-19 NOTE — PROGRESS NOTE ADULT - ASSESSMENT
Doing well. No concerns today.     - planning tubal ligation- has signed papers   - declined flu vaccine   - labor precautions reviewed   - peds- needs to select   - HSV- valacylovir prescribed   - GBS next visit     MARGARET 2 weeks    70F with PMH HTN, HLD presents to [facility] for palpitations exacerbated with exertion which have been progressively worsening for 1month. For the last week she has felt increasingly dizzy and generalized weakness.    Palpitations  HTN / DL          PLAN:    ·	Cont tele  ·	EP eval noted. Recommended nuclear stress test and loop recorder. 70F with PMH HTN, HLD presents to [facility] for palpitations exacerbated with exertion which have been progressively worsening for 1month. For the last week she has felt increasingly dizzy and generalized weakness.    Palpitations  HTN / DL          PLAN:    ·	Cont tele  ·	EP eval noted. Recommended nuclear stress test and loop recorder.   ·	Nuclear stress test is negative for ischemia.   ·	Pt can be discharged home after loop recorder by EP  ·	No events on tele.   ·	EKG had non specific ST, T changes  ·	ECHO

## 2020-10-20 ENCOUNTER — TRANSCRIPTION ENCOUNTER (OUTPATIENT)
Age: 70
End: 2020-10-20

## 2020-10-20 VITALS
TEMPERATURE: 98 F | DIASTOLIC BLOOD PRESSURE: 78 MMHG | SYSTOLIC BLOOD PRESSURE: 127 MMHG | HEART RATE: 72 BPM | RESPIRATION RATE: 18 BRPM

## 2020-10-20 LAB
ANION GAP SERPL CALC-SCNC: 12 MMOL/L — SIGNIFICANT CHANGE UP (ref 7–14)
BASOPHILS # BLD AUTO: 0.03 K/UL — SIGNIFICANT CHANGE UP (ref 0–0.2)
BASOPHILS NFR BLD AUTO: 0.9 % — SIGNIFICANT CHANGE UP (ref 0–1)
BUN SERPL-MCNC: 19 MG/DL — SIGNIFICANT CHANGE UP (ref 10–20)
CALCIUM SERPL-MCNC: 9.2 MG/DL — SIGNIFICANT CHANGE UP (ref 8.5–10.1)
CHLORIDE SERPL-SCNC: 109 MMOL/L — SIGNIFICANT CHANGE UP (ref 98–110)
CO2 SERPL-SCNC: 18 MMOL/L — SIGNIFICANT CHANGE UP (ref 17–32)
CREAT SERPL-MCNC: 0.9 MG/DL — SIGNIFICANT CHANGE UP (ref 0.7–1.5)
EOSINOPHIL # BLD AUTO: 0.09 K/UL — SIGNIFICANT CHANGE UP (ref 0–0.7)
EOSINOPHIL NFR BLD AUTO: 2.8 % — SIGNIFICANT CHANGE UP (ref 0–8)
GLUCOSE SERPL-MCNC: 81 MG/DL — SIGNIFICANT CHANGE UP (ref 70–99)
HCT VFR BLD CALC: 38.3 % — SIGNIFICANT CHANGE UP (ref 37–47)
HGB BLD-MCNC: 12.5 G/DL — SIGNIFICANT CHANGE UP (ref 12–16)
IMM GRANULOCYTES NFR BLD AUTO: 0 % — LOW (ref 0.1–0.3)
LYMPHOCYTES # BLD AUTO: 1.66 K/UL — SIGNIFICANT CHANGE UP (ref 1.2–3.4)
LYMPHOCYTES # BLD AUTO: 51.7 % — HIGH (ref 20.5–51.1)
MAGNESIUM SERPL-MCNC: 2 MG/DL — SIGNIFICANT CHANGE UP (ref 1.8–2.4)
MCHC RBC-ENTMCNC: 29.1 PG — SIGNIFICANT CHANGE UP (ref 27–31)
MCHC RBC-ENTMCNC: 32.6 G/DL — SIGNIFICANT CHANGE UP (ref 32–37)
MCV RBC AUTO: 89.1 FL — SIGNIFICANT CHANGE UP (ref 81–99)
MONOCYTES # BLD AUTO: 0.47 K/UL — SIGNIFICANT CHANGE UP (ref 0.1–0.6)
MONOCYTES NFR BLD AUTO: 14.6 % — HIGH (ref 1.7–9.3)
NEUTROPHILS # BLD AUTO: 0.96 K/UL — LOW (ref 1.4–6.5)
NEUTROPHILS NFR BLD AUTO: 30 % — LOW (ref 42.2–75.2)
NRBC # BLD: 0 /100 WBCS — SIGNIFICANT CHANGE UP (ref 0–0)
PLATELET # BLD AUTO: 201 K/UL — SIGNIFICANT CHANGE UP (ref 130–400)
POTASSIUM SERPL-MCNC: 4.9 MMOL/L — SIGNIFICANT CHANGE UP (ref 3.5–5)
POTASSIUM SERPL-SCNC: 4.9 MMOL/L — SIGNIFICANT CHANGE UP (ref 3.5–5)
RBC # BLD: 4.3 M/UL — SIGNIFICANT CHANGE UP (ref 4.2–5.4)
RBC # FLD: 12.5 % — SIGNIFICANT CHANGE UP (ref 11.5–14.5)
SODIUM SERPL-SCNC: 139 MMOL/L — SIGNIFICANT CHANGE UP (ref 135–146)
WBC # BLD: 3.21 K/UL — LOW (ref 4.8–10.8)
WBC # FLD AUTO: 3.21 K/UL — LOW (ref 4.8–10.8)

## 2020-10-20 PROCEDURE — 99239 HOSP IP/OBS DSCHRG MGMT >30: CPT

## 2020-10-20 RX ADMIN — ENOXAPARIN SODIUM 40 MILLIGRAM(S): 100 INJECTION SUBCUTANEOUS at 11:08

## 2020-10-20 RX ADMIN — INFLUENZA VIRUS VACCINE 0.5 MILLILITER(S): 15; 15; 15; 15 SUSPENSION INTRAMUSCULAR at 13:14

## 2020-10-20 RX ADMIN — Medication 81 MILLIGRAM(S): at 12:29

## 2020-10-20 RX ADMIN — AMLODIPINE BESYLATE 5 MILLIGRAM(S): 2.5 TABLET ORAL at 05:23

## 2020-10-20 NOTE — PROGRESS NOTE ADULT - SUBJECTIVE AND OBJECTIVE BOX
Patient is a 70y old  Female who presents with a chief complaint of Palpitations (19 Oct 2020 15:44)    HPI:  70F with PMH HTN, HLD presents to [facility] for palpitations exacerbated with exertion which have been progressively worsening for 1month. For the last week she has felt increasingly dizzy and generalized weakness. Reports an episode of vomiting today nbnb. She saw her PCP who gave her f/u with EP in December. She denies CP, SOB, diarrhea, bloody or melanotic stool, weight loss. Per pt palpitations have been getting more frequent , in the ED telemetry monitor consistent with frequent PVC's.  (17 Oct 2020 23:12)    PAST MEDICAL & SURGICAL HISTORY:  Hypercholesteremia    HTN (hypertension)    patient seen and examined independently on morning rounds for the first time today, chart reviewed and discussed with the medicine resident and on interdisciplinary rounds.    no overnight events---s/p Event monitor placed 10/19- NST negative- stable for discharge home today    Vital Signs Last 24 Hrs  T(C): 36.8 (20 Oct 2020 12:39), Max: 36.8 (20 Oct 2020 12:39)  T(F): 98.2 (20 Oct 2020 12:39), Max: 98.2 (20 Oct 2020 12:39)  HR: 72 (20 Oct 2020 12:39) (67 - 72)  BP: 127/78 (20 Oct 2020 12:39) (119/68 - 133/63)  BP(mean): --  RR: 18 (20 Oct 2020 12:39) (18 - 18)  SpO2: --             PE:  GEN-NAD, AAOx3  PULM- Clear to auscultation bilaterally, fair air entry  CVS- +s1/s2 RRR no murmurs  GI- soft NT ND +bs, no rebound, no guarding  EXT- no edema               12.5   3.21  )-----------( 201      ( 20 Oct 2020 06:56 )             38.3     10-20    139  |  109  |  19  ----------------------------<  81  4.9   |  18  |  0.9    Ca    9.2      20 Oct 2020 06:56  Mg     2.0     10-20          MEDICATIONS  (STANDING):  amLODIPine   Tablet 5 milliGRAM(s) Oral daily  aspirin  chewable 81 milliGRAM(s) Oral daily  atorvastatin 10 milliGRAM(s) Oral at bedtime  enoxaparin Injectable 40 milliGRAM(s) SubCutaneous daily

## 2020-10-20 NOTE — CHART NOTE - NSCHARTNOTEFT_GEN_A_CORE
<<<RESIDENT DISCHARGE NOTE>>>     EDILIA MENON  MRN-[id number]    70F with PMH HTN, HLD presents to [facility] for palpitations exacerbated with exertion which have been progressively worsening for 1 month. For the last week she has felt increasingly dizzy and generalized weakness.  Negative stress test on 10/19. Event monitor placed by EP on 10/19.      VITAL SIGNS:  T(F): 97.9 (10-20-20 @ 05:00), Max: 98 (10-19-20 @ 21:04)  HR: 68 (10-20-20 @ 05:00)  BP: 133/63 (10-20-20 @ 05:00)  SpO2: --      PHYSICAL EXAMINATION:  General:   Head & Neck:  Pulmonary:  Cardiovascular:  Gastrointestinal/Abdomen & Pelvis:  Neurologic/Motor:    TEST RESULTS:                        12.5   3.21  )-----------( 201      ( 20 Oct 2020 06:56 )             38.3       10-20    139  |  109  |  19  ----------------------------<  81  4.9   |  18  |  0.9    Ca    9.2      20 Oct 2020 06:56  Mg     2.0     10-20        FINAL DISCHARGE INTERVIEW:  Resident(s) Present: (Name:Gisele Dave__)      DISPOSITION:   [ x ] Home,    [  ] Home with Visiting Nursing Services,   [    ]  SNF/ NH,    [   ] Acute Rehab (4A),   [   ] Other (Specify:_________) <<<RESIDENT DISCHARGE NOTE>>>     EDILIA MENON  MRN-[id number]    70F with PMH HTN, HLD presents to [facility] for palpitations exacerbated with exertion which have been progressively worsening for 1 month. For the last week she has felt increasingly dizzy and generalized weakness.  Negative stress test on 10/19. Event monitor placed by EP on 10/19.      VITAL SIGNS:  T(F): 97.9 (10-20-20 @ 05:00), Max: 98 (10-19-20 @ 21:04)  HR: 68 (10-20-20 @ 05:00)  BP: 133/63 (10-20-20 @ 05:00)  SpO2: --      PHYSICAL EXAMINATION:  GEN: No acute distress  LUNGS: Clear to auscultation bilaterally   HEART: S1/S2 present. RRR.   ABD: Soft, NT/ND. BS +  EXT: no cyanosis/edema  NEURO: AAOX3      TEST RESULTS:                        12.5   3.21  )-----------( 201      ( 20 Oct 2020 06:56 )             38.3       10-20    139  |  109  |  19  ----------------------------<  81  4.9   |  18  |  0.9    Ca    9.2      20 Oct 2020 06:56  Mg     2.0     10-20        FINAL DISCHARGE INTERVIEW:  Resident(s) Present: (Name:Gisele Dave__)      DISPOSITION:   [ x ] Home,    [  ] Home with Visiting Nursing Services,   [    ]  SNF/ NH,    [   ] Acute Rehab (4A),   [   ] Other (Specify:_________)

## 2020-10-20 NOTE — PROGRESS NOTE ADULT - ASSESSMENT
a/p:      time spendt on discharge >30 minutes including coordination of discharge care    discharge home today- plan for f/u with outpatient pcp and with EPS/Cardiology as outpatient    s/p event monitor placed (10/19/20)  NST 10/19/20 negative for ischemia  ECHO- EF 66%, grade 1 diastolic dysfunction   continue with current managment--f/u fasting lipid profile as outpatient    DISCHARGE home today

## 2020-10-20 NOTE — DISCHARGE NOTE NURSING/CASE MANAGEMENT/SOCIAL WORK - PATIENT PORTAL LINK FT
You can access the FollowMyHealth Patient Portal offered by [facility] by registering at the following website: [email or web address]. By joining [employer]’s FollowMyHealth portal, you will also be able to view your health information using other applications (apps) compatible with our system.

## 2020-10-22 DIAGNOSIS — R00.2 PALPITATIONS: ICD-10-CM

## 2020-10-22 DIAGNOSIS — E78.00 PURE HYPERCHOLESTEROLEMIA, UNSPECIFIED: ICD-10-CM

## 2020-10-22 DIAGNOSIS — I10 ESSENTIAL (PRIMARY) HYPERTENSION: ICD-10-CM

## 2020-10-22 DIAGNOSIS — I49.3 VENTRICULAR PREMATURE DEPOLARIZATION: ICD-10-CM

## 2020-10-22 DIAGNOSIS — R53.1 WEAKNESS: ICD-10-CM

## 2020-10-22 DIAGNOSIS — R42 DIZZINESS AND GIDDINESS: ICD-10-CM

## 2020-12-17 ENCOUNTER — APPOINTMENT (OUTPATIENT)
Dept: CARDIOLOGY | Facility: CLINIC | Age: 70
End: 2020-12-17

## 2021-01-21 ENCOUNTER — APPOINTMENT (OUTPATIENT)
Dept: CARDIOLOGY | Facility: CLINIC | Age: 71
End: 2021-01-21
Payer: MEDICAID

## 2021-01-21 VITALS
BODY MASS INDEX: 27.19 KG/M2 | SYSTOLIC BLOOD PRESSURE: 100 MMHG | TEMPERATURE: 97.9 F | HEIGHT: 61 IN | HEART RATE: 78 BPM | WEIGHT: 144 LBS | DIASTOLIC BLOOD PRESSURE: 60 MMHG

## 2021-01-21 PROCEDURE — 99213 OFFICE O/P EST LOW 20 MIN: CPT

## 2021-01-21 PROCEDURE — 99072 ADDL SUPL MATRL&STAF TM PHE: CPT

## 2021-01-21 NOTE — HISTORY OF PRESENT ILLNESS
[FreeTextEntry1] : 70F with PMH HTN, HLD presents to [facility] for palpitations exacerbated with  exertion which have been progressively worsening for 1 month.  Patient event monitor showed low burden of APCs and PVCs. Today patient states that she feels fine no further palpitations. No syncope chest pain nausea vomiting.\par \par EKG SR 78 bpm

## 2021-01-21 NOTE — PHYSICAL EXAM
[Normal Conjunctiva] : the conjunctiva exhibited no abnormalities [Eyelids - No Xanthelasma] : the eyelids demonstrated no xanthelasmas [Normal Oral Mucosa] : normal oral mucosa [No Oral Pallor] : no oral pallor [No Oral Cyanosis] : no oral cyanosis [Normal Jugular Venous A Waves Present] : normal jugular venous A waves present [Normal Jugular Venous V Waves Present] : normal jugular venous V waves present [No Jugular Venous Garcia A Waves] : no jugular venous garcia A waves [Heart Rate And Rhythm] : heart rate and rhythm were normal [Heart Sounds] : normal S1 and S2 [Murmurs] : no murmurs present [Exaggerated Use Of Accessory Muscles For Inspiration] : no accessory muscle use [Respiration, Rhythm And Depth] : normal respiratory rhythm and effort [Auscultation Breath Sounds / Voice Sounds] : lungs were clear to auscultation bilaterally [Nail Clubbing] : no clubbing of the fingernails [Cyanosis, Localized] : no localized cyanosis [Petechial Hemorrhages (___cm)] : no petechial hemorrhages [] : no ischemic changes

## 2021-01-21 NOTE — ASSESSMENT
[FreeTextEntry1] : Palpitations – likely related to APCs and PVCs\par – patient symptoms have significantly improved\par – at this time do not recommend any EP intervention.\par Continue to monitor and return as needed.

## 2021-09-16 ENCOUNTER — APPOINTMENT (OUTPATIENT)
Dept: CARDIOLOGY | Facility: CLINIC | Age: 71
End: 2021-09-16
Payer: MEDICAID

## 2021-09-16 VITALS
HEART RATE: 71 BPM | SYSTOLIC BLOOD PRESSURE: 119 MMHG | BODY MASS INDEX: 26.43 KG/M2 | HEIGHT: 61 IN | WEIGHT: 140 LBS | TEMPERATURE: 97.3 F | DIASTOLIC BLOOD PRESSURE: 71 MMHG

## 2021-09-16 DIAGNOSIS — Z78.9 OTHER SPECIFIED HEALTH STATUS: ICD-10-CM

## 2021-09-16 PROCEDURE — 99213 OFFICE O/P EST LOW 20 MIN: CPT

## 2021-09-16 PROCEDURE — 93000 ELECTROCARDIOGRAM COMPLETE: CPT

## 2021-09-16 NOTE — HISTORY OF PRESENT ILLNESS
[FreeTextEntry1] : 70F with PMH HTN, HLD presents to [facility] for palpitations exacerbated with  exertion which have been progressively worsening for 1 month.  Patient event monitor showed low burden of APCs and PVCs. Today patient states that she feels fine no further palpitations. No syncope chest pain nausea vomiting.\par \par Patient denies palpitation now- significant improvement. Pt c/o weakness only. No CP\par \par EKG SR 71 bpm\par Nuc - 10/20 - normal EF normal

## 2021-09-16 NOTE — ASSESSMENT
[FreeTextEntry1] : Palpitations – resolved\par – patient symptoms have significantly improved\par – at this time do not recommend any EP intervention.\par \par \par weakness\par - ? etiology; recommend to FU with patient's primary for fuhrer evaluation

## 2021-09-16 NOTE — PHYSICAL EXAM
[Normal Conjunctiva] : the conjunctiva exhibited no abnormalities [Eyelids - No Xanthelasma] : the eyelids demonstrated no xanthelasmas [Normal Oral Mucosa] : normal oral mucosa [No Oral Pallor] : no oral pallor [No Oral Cyanosis] : no oral cyanosis [Normal Jugular Venous A Waves Present] : normal jugular venous A waves present [Normal Jugular Venous V Waves Present] : normal jugular venous V waves present [No Jugular Venous Garcia A Waves] : no jugular venous garcia A waves [Heart Rate And Rhythm] : heart rate and rhythm were normal [Heart Sounds] : normal S1 and S2 [Murmurs] : no murmurs present [Respiration, Rhythm And Depth] : normal respiratory rhythm and effort [Exaggerated Use Of Accessory Muscles For Inspiration] : no accessory muscle use [Auscultation Breath Sounds / Voice Sounds] : lungs were clear to auscultation bilaterally [Nail Clubbing] : no clubbing of the fingernails [Cyanosis, Localized] : no localized cyanosis [Petechial Hemorrhages (___cm)] : no petechial hemorrhages [] : no ischemic changes

## 2021-09-17 ENCOUNTER — APPOINTMENT (OUTPATIENT)
Dept: CARDIOLOGY | Facility: CLINIC | Age: 71
End: 2021-09-17

## 2021-11-30 ENCOUNTER — EMERGENCY (EMERGENCY)
Facility: HOSPITAL | Age: 71
LOS: 0 days | Discharge: HOME | End: 2021-11-30
Attending: EMERGENCY MEDICINE | Admitting: EMERGENCY MEDICINE
Payer: MEDICAID

## 2021-11-30 VITALS
TEMPERATURE: 98 F | OXYGEN SATURATION: 99 % | RESPIRATION RATE: 18 BRPM | DIASTOLIC BLOOD PRESSURE: 64 MMHG | SYSTOLIC BLOOD PRESSURE: 121 MMHG | HEART RATE: 77 BPM

## 2021-11-30 DIAGNOSIS — E03.9 HYPOTHYROIDISM, UNSPECIFIED: ICD-10-CM

## 2021-11-30 DIAGNOSIS — Z79.02 LONG TERM (CURRENT) USE OF ANTITHROMBOTICS/ANTIPLATELETS: ICD-10-CM

## 2021-11-30 DIAGNOSIS — R07.89 OTHER CHEST PAIN: ICD-10-CM

## 2021-11-30 DIAGNOSIS — I10 ESSENTIAL (PRIMARY) HYPERTENSION: ICD-10-CM

## 2021-11-30 DIAGNOSIS — Z20.822 CONTACT WITH AND (SUSPECTED) EXPOSURE TO COVID-19: ICD-10-CM

## 2021-11-30 DIAGNOSIS — R07.9 CHEST PAIN, UNSPECIFIED: ICD-10-CM

## 2021-11-30 LAB
ALBUMIN SERPL ELPH-MCNC: 4.9 G/DL — SIGNIFICANT CHANGE UP (ref 3.5–5.2)
ALP SERPL-CCNC: 78 U/L — SIGNIFICANT CHANGE UP (ref 30–115)
ALT FLD-CCNC: 18 U/L — SIGNIFICANT CHANGE UP (ref 0–41)
ANION GAP SERPL CALC-SCNC: 14 MMOL/L — SIGNIFICANT CHANGE UP (ref 7–14)
AST SERPL-CCNC: 20 U/L — SIGNIFICANT CHANGE UP (ref 0–41)
BASOPHILS # BLD AUTO: 0.05 K/UL — SIGNIFICANT CHANGE UP (ref 0–0.2)
BASOPHILS NFR BLD AUTO: 1.1 % — HIGH (ref 0–1)
BILIRUB SERPL-MCNC: 0.3 MG/DL — SIGNIFICANT CHANGE UP (ref 0.2–1.2)
BUN SERPL-MCNC: 15 MG/DL — SIGNIFICANT CHANGE UP (ref 10–20)
CALCIUM SERPL-MCNC: 9.8 MG/DL — SIGNIFICANT CHANGE UP (ref 8.5–10.1)
CHLORIDE SERPL-SCNC: 101 MMOL/L — SIGNIFICANT CHANGE UP (ref 98–110)
CO2 SERPL-SCNC: 23 MMOL/L — SIGNIFICANT CHANGE UP (ref 17–32)
CREAT SERPL-MCNC: 0.8 MG/DL — SIGNIFICANT CHANGE UP (ref 0.7–1.5)
EOSINOPHIL # BLD AUTO: 0.09 K/UL — SIGNIFICANT CHANGE UP (ref 0–0.7)
EOSINOPHIL NFR BLD AUTO: 1.9 % — SIGNIFICANT CHANGE UP (ref 0–8)
GLUCOSE SERPL-MCNC: 92 MG/DL — SIGNIFICANT CHANGE UP (ref 70–99)
HCT VFR BLD CALC: 37.3 % — SIGNIFICANT CHANGE UP (ref 37–47)
HGB BLD-MCNC: 12.3 G/DL — SIGNIFICANT CHANGE UP (ref 12–16)
IMM GRANULOCYTES NFR BLD AUTO: 0.2 % — SIGNIFICANT CHANGE UP (ref 0.1–0.3)
LYMPHOCYTES # BLD AUTO: 1.7 K/UL — SIGNIFICANT CHANGE UP (ref 1.2–3.4)
LYMPHOCYTES # BLD AUTO: 36.8 % — SIGNIFICANT CHANGE UP (ref 20.5–51.1)
MCHC RBC-ENTMCNC: 29.4 PG — SIGNIFICANT CHANGE UP (ref 27–31)
MCHC RBC-ENTMCNC: 33 G/DL — SIGNIFICANT CHANGE UP (ref 32–37)
MCV RBC AUTO: 89 FL — SIGNIFICANT CHANGE UP (ref 81–99)
MONOCYTES # BLD AUTO: 0.47 K/UL — SIGNIFICANT CHANGE UP (ref 0.1–0.6)
MONOCYTES NFR BLD AUTO: 10.2 % — HIGH (ref 1.7–9.3)
NEUTROPHILS # BLD AUTO: 2.3 K/UL — SIGNIFICANT CHANGE UP (ref 1.4–6.5)
NEUTROPHILS NFR BLD AUTO: 49.8 % — SIGNIFICANT CHANGE UP (ref 42.2–75.2)
NRBC # BLD: 0 /100 WBCS — SIGNIFICANT CHANGE UP (ref 0–0)
PLATELET # BLD AUTO: 228 K/UL — SIGNIFICANT CHANGE UP (ref 130–400)
POTASSIUM SERPL-MCNC: 4.6 MMOL/L — SIGNIFICANT CHANGE UP (ref 3.5–5)
POTASSIUM SERPL-SCNC: 4.6 MMOL/L — SIGNIFICANT CHANGE UP (ref 3.5–5)
PROT SERPL-MCNC: 8.6 G/DL — HIGH (ref 6–8)
RBC # BLD: 4.19 M/UL — LOW (ref 4.2–5.4)
RBC # FLD: 12.1 % — SIGNIFICANT CHANGE UP (ref 11.5–14.5)
SARS-COV-2 RNA SPEC QL NAA+PROBE: SIGNIFICANT CHANGE UP
SODIUM SERPL-SCNC: 138 MMOL/L — SIGNIFICANT CHANGE UP (ref 135–146)
TROPONIN T SERPL-MCNC: <0.01 NG/ML — SIGNIFICANT CHANGE UP
TROPONIN T SERPL-MCNC: <0.01 NG/ML — SIGNIFICANT CHANGE UP
WBC # BLD: 4.62 K/UL — LOW (ref 4.8–10.8)
WBC # FLD AUTO: 4.62 K/UL — LOW (ref 4.8–10.8)

## 2021-11-30 PROCEDURE — 93010 ELECTROCARDIOGRAM REPORT: CPT | Mod: 77

## 2021-11-30 PROCEDURE — 71046 X-RAY EXAM CHEST 2 VIEWS: CPT | Mod: 26

## 2021-11-30 PROCEDURE — 99220: CPT

## 2021-11-30 PROCEDURE — 93010 ELECTROCARDIOGRAM REPORT: CPT

## 2021-11-30 RX ORDER — ATORVASTATIN CALCIUM 80 MG/1
10 TABLET, FILM COATED ORAL AT BEDTIME
Refills: 0 | Status: DISCONTINUED | OUTPATIENT
Start: 2021-11-30 | End: 2021-11-30

## 2021-11-30 RX ADMIN — ATORVASTATIN CALCIUM 10 MILLIGRAM(S): 80 TABLET, FILM COATED ORAL at 22:52

## 2021-11-30 NOTE — ED PROVIDER NOTE - ATTENDING CONTRIBUTION TO CARE
71F PMH HTN HL, nonsmoker p/w 1 day of palpitations. substernal chest pressure intermittent nonradiating associated. no sob. no fever, cough. no trauma. no tearing bp. no le edema, le pain, immobilization, hormones, hemoptysis. pt states she has stress test recently at [facility]. symptoms today while cooking. states her BP was high. pt take amlodipine 5mg but has been taking 10mg since BP has been high. felt lightheaded. no longer has cp or dizziness.    on exam, AFVSS, well kathi nad, ncat, eomi, perrla, mmm, lctab, rrr nl s1s2 no mrg, abd soft ntnd, aaox3, no focal deficits, no le edema or calf ttp,    a/p; CP r/o ACS. will do labs, ekg/trop, CXR tele asa re-eval

## 2021-11-30 NOTE — ED CDU PROVIDER INITIAL DAY NOTE - ATTENDING CONTRIBUTION TO CARE
72 yo F pmh of HTN, hypothyroid presents with chest pain and elevated blood pressure. States that at rest started to feel like her blood pressure was going up and then felt some chest pressure. Took her BP meds with some improvement. States that she had a similar episode 1 month ago and had a stress test done at [facility] at that time. Unsure who her cardiologist is. Symptoms have since resolved.,  no n/v, no abdominal pain.     CONSTITUTIONAL: Well-developed; well-nourished; in no acute distress.   SKIN: warm, dry  HEAD: Normocephalic; atraumatic.  EYES: PERRL, EOMI, no conjunctival erythema  ENT: No nasal discharge; airway clear.  NECK: Supple; non tender.  CARD: S1, S2 normal;  Regular rate and rhythm.   RESP: No wheezes, rales or rhonchi.  ABD: soft non tender, non distended, no rebound or guarding  EXT: Normal ROM.  5/5 strength in all 4 extremities   LYMPH: No acute cervical adenopathy.  NEURO: Alert, oriented, grossly unremarkable. neurovascularly intact  PSYCH: Cooperative, appropriate.

## 2021-11-30 NOTE — ED PROVIDER NOTE - NS ED ROS FT
Review of Systems:  	•	CONSTITUTIONAL - no fever, no diaphoresis, no chills  	•	SKIN - no rash  	•	HEMATOLOGIC - no bleeding, no bruising  	•	EYES - no eye pain, no blurry vision  	•	ENT - no change in hearing, no sore throat, no ear pain or tinnitus  	•	RESPIRATORY - no shortness of breath, no cough  	•	CARDIAC - + chest pain, no palpitations  	•	GI - no abd pain, no nausea, no vomiting, no diarrhea, no constipation  	•	GENITO-URINARY - no discharge, no dysuria; no hematuria, no increased urinary frequency  	•	MUSCULOSKELETAL - no joint paint, no swelling, no redness  	•	NEUROLOGIC - no weakness, no headache, no paresthesias, no LOC

## 2021-11-30 NOTE — ED PROVIDER NOTE - OBSERVING MD:
Patient: Maggy Lake Date: 5/19/2017   YOB: 1991 Admission Date: 5/19/2017   MRN: [id number] Attending: Dr. Foreman     PREOPERATIVE DIAGNOSIS:  Lumbar radicular pain (radiculitis).     POSTOPERATIVE DIAGNOSIS:  Lumbar radicular pain (radiculitis).     PROCEDURE PERFORMED:  Lumbar intralaminar epidural steroid injection with fluoroscopic interpretation at L5-S1 level.     SURGEON:  Lucio Foreman MD     ANESTHESIA:   IV sedation/local (Moderate Sedation was administered with continuous monitoring of the patient by a trained caregiver under my direct supervision. Please refer to nursing documentation for the doses of medications administered intravenously as well as the patient's status during the procedure. Total sedation (intra-service) time in minutes was: 11. I was present throughout the sedation time.)      ESTIMATED BLOOD LOSS:  Less than 5 mL    SPECIMENS REMOVED:   N/A    ASSISTANTS:  None.     PROCEDURE IN DETAIL:  The patient was examined and informed of the risks and benefits of the procedure. Risks include but are not limited to infection, bleeding, PDPH, nerve injury, and continuation of pain.  Informed consent was obtained. It was confirmed that a competent / was available to drive the patient home.  The patient was placed in the prone position.  Monitors included ECG, pulse oximetry, and blood pressure.  Appropriate time out was called.  IV sedation was given by the RN with my instructions until moderate sedation was achieved.  The patient was conversant throughout the procedure.     The back was prepped and draped in the usual sterile fashion. A 20 gauge Tuohy epidural needle was advanced into the epidural space at the L5-S1 interspace, using loss of resistance technique as well as fluoroscopic guidance.  Under real time fluoroscopy intrathecal compatible dye was injected revealing epidural spread.  There was no intrathecal, subdural or intravascular spread.  The patient  received 7 mL of injectate.  The injectate contained 4 mL of 1% Lidocaine, 2 mL of preservative-free Saline, and 80 mg of Methylprednisolone Acetate.     There were no complications, and the patient tolerated the procedure well.    Dr. Foreman     Shukri

## 2021-11-30 NOTE — ED PROVIDER NOTE - OBJECTIVE STATEMENT
71 year old female with pmhx of htn and hld presents with chest pain since sunday. Pain to mid sternum, intermittent. pt denies sob, cough, fever, abd pain, nausea, vomiting, diarrhea, calf pain or swelling. pt had nuclear stress test and echo x 1 year ago which were negative. No recent travel

## 2021-12-01 VITALS — RESPIRATION RATE: 16 BRPM | OXYGEN SATURATION: 98 % | HEART RATE: 54 BPM

## 2021-12-01 PROCEDURE — 93016 CV STRESS TEST SUPVJ ONLY: CPT

## 2021-12-01 PROCEDURE — 93018 CV STRESS TEST I&R ONLY: CPT

## 2021-12-01 PROCEDURE — 78452 HT MUSCLE IMAGE SPECT MULT: CPT | Mod: 26,MA

## 2021-12-01 PROCEDURE — 75571 CT HRT W/O DYE W/CA TEST: CPT | Mod: 26,MA

## 2021-12-01 PROCEDURE — 99217: CPT

## 2021-12-01 RX ORDER — METOPROLOL TARTRATE 50 MG
50 TABLET ORAL ONCE
Refills: 0 | Status: COMPLETED | OUTPATIENT
Start: 2021-12-01 | End: 2021-12-01

## 2021-12-01 RX ORDER — METOPROLOL TARTRATE 50 MG
5 TABLET ORAL ONCE
Refills: 0 | Status: DISCONTINUED | OUTPATIENT
Start: 2021-12-01 | End: 2021-11-30

## 2021-12-01 RX ORDER — REGADENOSON 0.08 MG/ML
0.4 INJECTION, SOLUTION INTRAVENOUS ONCE
Refills: 0 | Status: DISCONTINUED | OUTPATIENT
Start: 2021-12-01 | End: 2021-11-30

## 2021-12-01 RX ORDER — ASPIRIN/CALCIUM CARB/MAGNESIUM 324 MG
325 TABLET ORAL ONCE
Refills: 0 | Status: COMPLETED | OUTPATIENT
Start: 2021-12-01 | End: 2021-12-01

## 2021-12-01 RX ADMIN — Medication 325 MILLIGRAM(S): at 08:42

## 2021-12-01 RX ADMIN — Medication 50 MILLIGRAM(S): at 08:42

## 2021-12-01 RX ADMIN — Medication 50 MILLIGRAM(S): at 06:16

## 2021-12-01 NOTE — ED CDU PROVIDER SUBSEQUENT DAY NOTE - PROGRESS NOTE DETAILS
patient resting comfortably. no complaints. will continue to observe Patient comfortable, awaiting CCTA Patient in ct scan, HR elevated, metoprolol given x 3 without enough reduction in HR. Scan cancelled. Plan for stress test. Patient is agreeable. Nuc stress test reviewed, will dc home with outpatient f/u.   Patient advised of anterior mediastinal nodularity and need for follow up CT and pulmonary nodules seen in CT calcium score. Copies of results given and advised of need for f/u for CT as outpatient.

## 2021-12-01 NOTE — ED CDU PROVIDER DISPOSITION NOTE - NSFOLLOWUPCLINICS_GEN_ALL_ED_FT
[facility]  Medicine  [address], [place]   Phone: [phone]  Fax:   Follow Up Time: 1-3 Days

## 2021-12-01 NOTE — ED CDU PROVIDER DISPOSITION NOTE - PATIENT PORTAL LINK FT
You can access the FollowMyHealth Patient Portal offered by [facility] by registering at the following website: [email or web address]. By joining [employer]’s FollowMyHealth portal, you will also be able to view your health information using other applications (apps) compatible with our system.

## 2021-12-01 NOTE — ED CDU PROVIDER DISPOSITION NOTE - PROVIDER TOKENS
PROVIDER:[TOKEN:[[id number]],FOLLOWUP:[1-3 Days]],PROVIDER:[TOKEN:[[id number]],FOLLOWUP:[1-3 Days]]

## 2021-12-01 NOTE — ED CDU PROVIDER DISPOSITION NOTE - ATTENDING CONTRIBUTION TO CARE
72 yo F pmh of HTN, hypothyroid presents with chest pain and elevated blood pressure. States that at rest started to feel like her blood pressure was going up and then felt some chest pressure. Took her BP meds with some improvement. States that she had a similar episode 1 month ago and had a stress test done at [facility] at that time. Unsure who her cardiologist is. Symptoms have since resolved.,  no n/v, no abdominal pain. Patient placed in obs for cardiac evaluation.     CONSTITUTIONAL: Well-developed; well-nourished; in no acute distress.   SKIN: warm, dry  HEAD: Normocephalic; atraumatic.  EYES: PERRL, EOMI, no conjunctival erythema  ENT: No nasal discharge; airway clear.  NECK: Supple; non tender.  CARD: S1, S2 normal;  Regular rate and rhythm.   RESP: No wheezes, rales or rhonchi.  ABD: soft non tender, non distended, no rebound or guarding  EXT: Normal ROM.  5/5 strength in all 4 extremities   LYMPH: No acute cervical adenopathy.  NEURO: Alert, oriented, grossly unremarkable. neurovascularly intact  PSYCH: Cooperative, appropriate.

## 2021-12-01 NOTE — ED CDU PROVIDER SUBSEQUENT DAY NOTE - ATTENDING CONTRIBUTION TO CARE
72 yo F pmh of HTN, hypothyroid presents with chest pain and elevated blood pressure. States that at rest started to feel like her blood pressure was going up and then felt some chest pressure. Took her BP meds with some improvement. States that she had a similar episode 1 month ago and had a stress test done at [facility] at that time. Unsure who her cardiologist is. Symptoms have since resolved.,  no n/v, no abdominal pain. Patient placed in obs for cardiac evaluation.     CONSTITUTIONAL: Well-developed; well-nourished; in no acute distress.   SKIN: warm, dry  HEAD: Normocephalic; atraumatic.  EYES: PERRL, EOMI, no conjunctival erythema  ENT: No nasal discharge; airway clear.  NECK: Supple; non tender.  CARD: S1, S2 normal;  Regular rate and rhythm.   RESP: No wheezes, rales or rhonchi.  ABD: soft non tender, non distended, no rebound or guarding  EXT: Normal ROM.  5/5 strength in all 4 extremities   LYMPH: No acute cervical adenopathy.  NEURO: Alert, oriented, grossly unremarkable. neurovascularly intact  PSYCH: Cooperative, appropriate.

## 2021-12-01 NOTE — ED CDU PROVIDER DISPOSITION NOTE - CLINICAL COURSE
Patient presents with chest pain. labs, ekg, cxr done. Placed in obs for cardiac evaluation. Plan for CCTA btu patient's heart rate too high despite multiple dose of metroprolol. Stress test done that was normal. Results discussed. Discharged with pmd and cardiology follow up. Return precautions discussed.

## 2021-12-01 NOTE — ED CDU PROVIDER DISPOSITION NOTE - CARE PROVIDER_API CALL
Sukh Cline)  [address]  [address] [place] [address]  Phone: [phone]  Fax: [phone]  Follow Up Time: 1-3 Days    Nuan Chopra)  Cardiovascular Disease; Internal Medicine; Interventional Cardiology  [address]  [place] [address]  Phone: [phone]  Fax: [phone]  Follow Up Time: 1-3 Days

## 2021-12-08 ENCOUNTER — NON-APPOINTMENT (OUTPATIENT)
Age: 71
End: 2021-12-08

## 2021-12-08 ENCOUNTER — OUTPATIENT (OUTPATIENT)
Dept: OUTPATIENT SERVICES | Facility: HOSPITAL | Age: 71
LOS: 1 days | Discharge: HOME | End: 2021-12-08

## 2021-12-08 ENCOUNTER — APPOINTMENT (OUTPATIENT)
Age: 71
End: 2021-12-08
Payer: MEDICAID

## 2021-12-08 VITALS
DIASTOLIC BLOOD PRESSURE: 79 MMHG | SYSTOLIC BLOOD PRESSURE: 127 MMHG | BODY MASS INDEX: 28.51 KG/M2 | HEIGHT: 61 IN | OXYGEN SATURATION: 98 % | WEIGHT: 151 LBS | TEMPERATURE: 97.8 F | HEART RATE: 80 BPM

## 2021-12-08 PROCEDURE — 99203 OFFICE O/P NEW LOW 30 MIN: CPT | Mod: GC

## 2021-12-08 NOTE — PHYSICAL EXAM
[No Acute Distress] : no acute distress [Normal Sclera/Conjunctiva] : normal sclera/conjunctiva [Normal Outer Ear/Nose] : the outer ears and nose were normal in appearance [No Respiratory Distress] : no respiratory distress  [Normal Rate] : normal rate  [Soft] : abdomen soft [Grossly Normal Strength/Tone] : grossly normal strength/tone [Coordination Grossly Intact] : coordination grossly intact [Normal Insight/Judgement] : insight and judgment were intact

## 2021-12-08 NOTE — ASSESSMENT
[FreeTextEntry1] : 70F with PMH HTN, HLD presents to establish new care\par \par #Burning sensation in abdomen\par - start trial of famotidine\par \par #Palpitations\par #Chest discomfort\par - NM stress test 12/2021 showed normal perfusion\par - follows electrophysiologist Dr. Torres\par - c/w aspirin\par - order TSH\par \par #HTN\par - c/w amlodipine 5 mg QD\par \par #HLD \par - c/w pravastatin\par \par #HCM\par - order lipid profile and a1c\par - mammogram referral given. Last one in 2019\par - Colonoscopy in 2018 was normal\par - flu shot taken in Nov 2021\par \par RTC in 3 months after labs

## 2021-12-08 NOTE — HISTORY OF PRESENT ILLNESS
[FreeTextEntry1] : new patient [de-identified] : 70F with PMH HTN, HLD presents to establish new care post hospitalization last week for palpitations and chest discomfort. Nuclear stress test was negative and therefore patient was discharged to home. Patient follows electrophysiologist for palpitations for conservative management of APCs and PVCs. Feels "hot" everywhere above the waist on her torso. Eats spicy food and a lot of tomatoes, green tea with lemon.\par Denies any chest pain, palpitations, nausea or vomiting

## 2021-12-13 ENCOUNTER — APPOINTMENT (OUTPATIENT)
Dept: CARDIOLOGY | Facility: CLINIC | Age: 71
End: 2021-12-13
Payer: MEDICAID

## 2021-12-13 VITALS
TEMPERATURE: 97.3 F | BODY MASS INDEX: 28.13 KG/M2 | HEART RATE: 78 BPM | SYSTOLIC BLOOD PRESSURE: 126 MMHG | DIASTOLIC BLOOD PRESSURE: 72 MMHG | HEIGHT: 61 IN | WEIGHT: 149 LBS

## 2021-12-13 DIAGNOSIS — E78.5 HYPERLIPIDEMIA, UNSPECIFIED: ICD-10-CM

## 2021-12-13 DIAGNOSIS — E55.9 VITAMIN D DEFICIENCY, UNSPECIFIED: ICD-10-CM

## 2021-12-13 DIAGNOSIS — R12 HEARTBURN: ICD-10-CM

## 2021-12-13 DIAGNOSIS — I10 ESSENTIAL (PRIMARY) HYPERTENSION: ICD-10-CM

## 2021-12-13 DIAGNOSIS — Z00.00 ENCOUNTER FOR GENERAL ADULT MEDICAL EXAMINATION WITHOUT ABNORMAL FINDINGS: ICD-10-CM

## 2021-12-13 PROCEDURE — 99204 OFFICE O/P NEW MOD 45 MIN: CPT

## 2021-12-13 PROCEDURE — 93000 ELECTROCARDIOGRAM COMPLETE: CPT

## 2021-12-13 NOTE — HISTORY OF PRESENT ILLNESS
[FreeTextEntry1] : 72 y/o Female with PMH HTN, HLD presents to establish new care post hospitalization last week for palpitations and chest discomfort. Nuclear stress test was negative and therefore patient was discharged to home. Patient follows electrophysiologist for palpitations for conservative management of APCs and PVCs. Feels "hot" everywhere above the waist on her torso. Eats spicy food and a lot of tomatoes, green tea with lemon.\par Denies any chest pain, palpitations, nausea or vomiting.\par CT chest - calcium score of 0. Nuclear - normal. MCOT - Occ. PAC/PVC.ECG - normal.

## 2021-12-13 NOTE — ASSESSMENT
[FreeTextEntry1] : Chest pain - atypical\par CT Calcium score - 0\par Nuclear stress test 11/2021 - normal\par Echo 2020 - normal LV function, no wall motion abnormalities\par \par \par Palpitations\par MCOT, EP evaluation noted and appreciated\par PAC's - conservative management.\par \par primary prevention\par Patient was advised about healthy lifestyle changes, including diet and exercise. Importance of sustained long-term weight loss was discussed, questions answered.\par F/w with PMD to evaluate for non-cardiac etiologies of chest pain.\par Return to cardiology PRN.

## 2021-12-15 LAB
25(OH)D3 SERPL-MCNC: 38 NG/ML
CHOLEST SERPL-MCNC: 161 MG/DL
ESTIMATED AVERAGE GLUCOSE: 94 MG/DL
FERRITIN SERPL-MCNC: 180 NG/ML
HBA1C MFR BLD HPLC: 4.9 %
HDLC SERPL-MCNC: 50 MG/DL
IRON SATN MFR SERPL: 26 %
IRON SERPL-MCNC: 80 UG/DL
LDLC SERPL CALC-MCNC: 97 MG/DL
NONHDLC SERPL-MCNC: 111 MG/DL
TIBC SERPL-MCNC: 310 UG/DL
TRIGL SERPL-MCNC: 73 MG/DL
TSH SERPL-ACNC: 1.92 UIU/ML
UIBC SERPL-MCNC: 230 UG/DL

## 2022-01-03 ENCOUNTER — RESULT REVIEW (OUTPATIENT)
Age: 72
End: 2022-01-03

## 2022-01-03 ENCOUNTER — OUTPATIENT (OUTPATIENT)
Dept: OUTPATIENT SERVICES | Facility: HOSPITAL | Age: 72
LOS: 1 days | Discharge: HOME | End: 2022-01-03
Payer: MEDICAID

## 2022-01-03 DIAGNOSIS — Z12.31 ENCOUNTER FOR SCREENING MAMMOGRAM FOR MALIGNANT NEOPLASM OF BREAST: ICD-10-CM

## 2022-01-03 PROCEDURE — 77067 SCR MAMMO BI INCL CAD: CPT | Mod: 26

## 2022-01-03 PROCEDURE — 77063 BREAST TOMOSYNTHESIS BI: CPT | Mod: 26

## 2022-01-12 ENCOUNTER — NON-APPOINTMENT (OUTPATIENT)
Age: 72
End: 2022-01-12

## 2022-01-12 ENCOUNTER — OUTPATIENT (OUTPATIENT)
Dept: OUTPATIENT SERVICES | Facility: HOSPITAL | Age: 72
LOS: 1 days | Discharge: HOME | End: 2022-01-12

## 2022-01-12 ENCOUNTER — APPOINTMENT (OUTPATIENT)
Dept: INTERNAL MEDICINE | Facility: CLINIC | Age: 72
End: 2022-01-12
Payer: MEDICAID

## 2022-01-12 VITALS
HEART RATE: 71 BPM | OXYGEN SATURATION: 99 % | DIASTOLIC BLOOD PRESSURE: 79 MMHG | TEMPERATURE: 97.7 F | BODY MASS INDEX: 27.94 KG/M2 | HEIGHT: 61 IN | WEIGHT: 148 LBS | SYSTOLIC BLOOD PRESSURE: 139 MMHG

## 2022-01-12 DIAGNOSIS — M19.90 UNSPECIFIED OSTEOARTHRITIS, UNSPECIFIED SITE: ICD-10-CM

## 2022-01-12 DIAGNOSIS — I10 ESSENTIAL (PRIMARY) HYPERTENSION: ICD-10-CM

## 2022-01-12 DIAGNOSIS — E78.5 HYPERLIPIDEMIA, UNSPECIFIED: ICD-10-CM

## 2022-01-12 PROCEDURE — 99213 OFFICE O/P EST LOW 20 MIN: CPT | Mod: GC

## 2022-01-12 NOTE — ASSESSMENT
[FreeTextEntry1] : 70F with PMH HTN, HLD presents for R shoulder pain\par \par # Shoulder Pain \par - R shoulder sharp pain \par - Hx of arthritis in knees and hips ; Tylenol and PT helps\par - prescribed Tylenol \par - referred for PT \par - F/u XR shoulder \par \par #Heartburn \par - Resolved with famotidine \par - re-evaluate at next visit ; patient can take famotidine PRN \par \par #Palpitations\par #Chest discomfort\par - NM stress test 12/2021 showed normal perfusion\par - follows electrophysiologist Dr. Torres\par - c/w aspirin\par - TSH wnl\par \par #HTN\par - c/w amlodipine 5 mg QD\par \par #HLD \par - c/w pravastatin\par \par #HCM\par - lipid profile , A1c WNL\par - mammogram WNL\par - Colonoscopy in 2019 was normal\par - flu shot taken in Nov 2021\par - COVID booster given today \par \par RTC in 6 months after labs. \par \par

## 2022-01-12 NOTE — HISTORY OF PRESENT ILLNESS
[FreeTextEntry1] : Shoulder Pain + medication refills  [de-identified] : 70F \par \par PMH HTN, HLD, APCs, PVCs,arthritis in knees and hips, L eye glaucoma \par \par 12/21 : went to Ed for palpitations. Negative nuclear stress test .Sent for opt conservative management of APCs and PVCs. \par \par Heart burn: resolved with famotidine 20mg qd\par \par Vitals : BP : 139/79 , HR 71\par BMI : 27\par \par Dec 14 labs :\par A1c 4.9\par Lipid profile : WNL \par Iron panel WNL \par Vit D : WNL\par Ferritin : 180 (>150)\par TSH : 1.92\par Mammogram 01/03 : no malig \par \par 2019 colonoscopy at [facility] : negative \par \par wants tylenol \par bone scan \par takes multivitamin \par \par \par \par \par

## 2022-01-12 NOTE — PHYSICAL EXAM
[Normal] : soft, non-tender, non-distended, no masses palpated, no HSM and normal bowel sounds [de-identified] : sharp R shoulder pain with full abduction , non tender shoulder

## 2022-01-14 ENCOUNTER — RESULT REVIEW (OUTPATIENT)
Age: 72
End: 2022-01-14

## 2022-01-14 ENCOUNTER — OUTPATIENT (OUTPATIENT)
Dept: OUTPATIENT SERVICES | Facility: HOSPITAL | Age: 72
LOS: 1 days | Discharge: HOME | End: 2022-01-14
Payer: MEDICAID

## 2022-01-14 DIAGNOSIS — M19.90 UNSPECIFIED OSTEOARTHRITIS, UNSPECIFIED SITE: ICD-10-CM

## 2022-01-14 PROCEDURE — 73030 X-RAY EXAM OF SHOULDER: CPT | Mod: 26,RT

## 2022-01-19 LAB
ALBUMIN SERPL ELPH-MCNC: 4.7 G/DL
ALP BLD-CCNC: 70 U/L
ALT SERPL-CCNC: 18 U/L
ANION GAP SERPL CALC-SCNC: 17 MMOL/L
AST SERPL-CCNC: 22 U/L
BASOPHILS # BLD AUTO: 0.04 K/UL
BASOPHILS NFR BLD AUTO: 1.1 %
BILIRUB SERPL-MCNC: 0.3 MG/DL
BUN SERPL-MCNC: 11 MG/DL
CALCIUM SERPL-MCNC: 9.6 MG/DL
CHLORIDE SERPL-SCNC: 101 MMOL/L
CO2 SERPL-SCNC: 22 MMOL/L
CREAT SERPL-MCNC: 0.9 MG/DL
EOSINOPHIL # BLD AUTO: 0.12 K/UL
EOSINOPHIL NFR BLD AUTO: 3.3 %
GLUCOSE SERPL-MCNC: 89 MG/DL
HCT VFR BLD CALC: 37.7 %
HGB BLD-MCNC: 12.1 G/DL
IMM GRANULOCYTES NFR BLD AUTO: 0.3 %
LYMPHOCYTES # BLD AUTO: 1.57 K/UL
LYMPHOCYTES NFR BLD AUTO: 43.6 %
MAN DIFF?: NORMAL
MCHC RBC-ENTMCNC: 29.7 PG
MCHC RBC-ENTMCNC: 32.1 G/DL
MCV RBC AUTO: 92.4 FL
MONOCYTES # BLD AUTO: 0.47 K/UL
MONOCYTES NFR BLD AUTO: 13.1 %
NEUTROPHILS # BLD AUTO: 1.39 K/UL
NEUTROPHILS NFR BLD AUTO: 38.6 %
PLATELET # BLD AUTO: 223 K/UL
POTASSIUM SERPL-SCNC: 4 MMOL/L
PROT SERPL-MCNC: 7.7 G/DL
RBC # BLD: 4.08 M/UL
RBC # FLD: 12.7 %
SODIUM SERPL-SCNC: 140 MMOL/L
WBC # FLD AUTO: 3.6 K/UL

## 2022-01-31 ENCOUNTER — RESULT CHARGE (OUTPATIENT)
Age: 72
End: 2022-01-31

## 2022-01-31 ENCOUNTER — APPOINTMENT (OUTPATIENT)
Dept: CARDIOLOGY | Facility: CLINIC | Age: 72
End: 2022-01-31
Payer: MEDICAID

## 2022-01-31 VITALS
BODY MASS INDEX: 27.94 KG/M2 | TEMPERATURE: 97.4 F | WEIGHT: 148 LBS | HEIGHT: 61 IN | HEART RATE: 82 BPM | SYSTOLIC BLOOD PRESSURE: 104 MMHG | DIASTOLIC BLOOD PRESSURE: 64 MMHG

## 2022-01-31 PROCEDURE — 99213 OFFICE O/P EST LOW 20 MIN: CPT

## 2022-01-31 PROCEDURE — 93000 ELECTROCARDIOGRAM COMPLETE: CPT

## 2022-01-31 NOTE — ASSESSMENT
[FreeTextEntry1] : Chest pain - atypical\par CT Calcium score - 0\par Nuclear stress test 11/2021 - normal\par Echo 2020 - normal LV function, no wall motion abnormalities\par \par \par Palpitations\par MCOT, EP evaluation noted and appreciated\par PAC's - conservative management.\par \par primary prevention\par Patient was advised about healthy lifestyle changes, including diet and exercise. Importance of sustained long-term weight loss was discussed, questions answered.\par F/w with PMD to evaluate for non-cardiac etiologies of chest pain.\par Consider GI evaluation.\par Return to cardiology PRN.

## 2022-01-31 NOTE — HISTORY OF PRESENT ILLNESS
[FreeTextEntry1] : 70 y/o Female with PMH HTN, HLD presents for f/u of palpitations and chest discomfort. Nuclear stress test was negative. Patient follows electrophysiologist for palpitations for conservative management of APCs and PVCs. Feels "hot" everywhere above the waist on her torso. Eats spicy food and a lot of tomatoes, green tea with lemon.\par Denies any chest pain, palpitations, nausea or vomiting.\par CT chest - calcium score of 0. Nuclear - normal. MCOT - Occ. PAC/PVC.ECG - normal.\par Echo - EF - 66%

## 2022-02-01 ENCOUNTER — RESULT REVIEW (OUTPATIENT)
Age: 72
End: 2022-02-01

## 2022-02-01 ENCOUNTER — OUTPATIENT (OUTPATIENT)
Dept: OUTPATIENT SERVICES | Facility: HOSPITAL | Age: 72
LOS: 1 days | Discharge: HOME | End: 2022-02-01

## 2022-02-02 DIAGNOSIS — N95.9 UNSPECIFIED MENOPAUSAL AND PERIMENOPAUSAL DISORDER: ICD-10-CM

## 2022-02-02 DIAGNOSIS — Z13.820 ENCOUNTER FOR SCREENING FOR OSTEOPOROSIS: ICD-10-CM

## 2022-02-04 ENCOUNTER — OUTPATIENT (OUTPATIENT)
Dept: OUTPATIENT SERVICES | Facility: HOSPITAL | Age: 72
LOS: 1 days | Discharge: HOME | End: 2022-02-04

## 2022-02-04 DIAGNOSIS — M19.011 PRIMARY OSTEOARTHRITIS, RIGHT SHOULDER: ICD-10-CM

## 2022-02-23 ENCOUNTER — OUTPATIENT (OUTPATIENT)
Dept: OUTPATIENT SERVICES | Facility: HOSPITAL | Age: 72
LOS: 1 days | Discharge: HOME | End: 2022-02-23

## 2022-02-23 ENCOUNTER — APPOINTMENT (OUTPATIENT)
Dept: INTERNAL MEDICINE | Facility: CLINIC | Age: 72
End: 2022-02-23
Payer: MEDICAID

## 2022-02-23 ENCOUNTER — NON-APPOINTMENT (OUTPATIENT)
Age: 72
End: 2022-02-23

## 2022-02-23 VITALS
OXYGEN SATURATION: 100 % | HEIGHT: 61 IN | BODY MASS INDEX: 29.07 KG/M2 | WEIGHT: 154 LBS | HEART RATE: 88 BPM | TEMPERATURE: 97.2 F | SYSTOLIC BLOOD PRESSURE: 107 MMHG | DIASTOLIC BLOOD PRESSURE: 72 MMHG

## 2022-02-23 PROCEDURE — 99213 OFFICE O/P EST LOW 20 MIN: CPT | Mod: GC

## 2022-03-02 NOTE — ASSESSMENT
[FreeTextEntry1] : 71 y/o F with PMHx of HTN, HLD, arthritis, APC/PVCs, presents for follow-up. \par \par # B/L heel pain\par - Achilles tendon tender point b/l, could reflect Achilles tendinitis\par - Podiatry referral\par \par # Insomnia \par - Advised behavioral changes \par \par # Shoulder Pain - Resolving \par - Patient taking Tylenol as needed\par - Continue PT 2x/week \par - XR R shoulder: no fracture/osseous abnormality; mild degenerative changes of right glenohumeral and AC joint \par \par # Heartburn - Resolved\par - Patient no longer taking famotidine \par - No need for GI referral at this time \par \par # Palpitations - Resolved\par # H/o APC/PVC\par - Echo in 2020 normal\par - NM stress test 12/2021 showed normal perfusion\par - TSH wnl\par - Follows electrophysiologist Dr. Torres\par - Continue aspirin\par \par # HTN\par - Continue amlodipine 5 mg QD\par \par # HLD \par - Continue pravastatin\par \par # HCM\par - Lipid profile , A1c WNL as of Jan 2022\par - Repeat blood work prior to next visit \par - Mammogram January 2022 was normal \par - Colonoscopy in 2019 at [facility] was normal\par - DEXA scan Feb 2022 normal bone mass \par - Flu shot taken in Nov 2021\par - COVID booster given January 2022\par - GYN referral for pap smear (patient has never had but is interested) \par \par RTC in 5 months\par \par

## 2022-03-02 NOTE — REVIEW OF SYSTEMS
[Joint Pain] : joint pain [Insomnia] : insomnia [Negative] : Constitutional [Vision Problems] : no vision problems [Sore Throat] : no sore throat [Chest Pain] : no chest pain [Palpitations] : no palpitations [Leg Claudication] : no leg claudication [Lower Ext Edema] : no lower extremity edema [Shortness Of Breath] : no shortness of breath [Cough] : no cough [Abdominal Pain] : no abdominal pain [Nausea] : no nausea [Constipation] : no constipation [Diarrhea] : diarrhea [Vomiting] : no vomiting [FreeTextEntry9] : pain in b/l ankle/ heels

## 2022-03-02 NOTE — HISTORY OF PRESENT ILLNESS
[FreeTextEntry1] : 1 month follow-up.  [de-identified] : 71 y/o female with PMHx of HTN, HLD, APCs/PVCs, arthritis, and L eye glaucoma, here for 1 month follow-up. Patient was seen 1 month ago following a ED visit on 12/21 for palpitations. She had previously had a normal Echo in 2020 and negative nuclear stress test in November 2021, and presented to clinic for management of APCs and PVCs. She was advised to follow-up with cardio, which she did, and was advised lifestyle changes. At the time, she also complained of R shoulder pain and heartburn. The plan was to give Tylenol and f/u XR results for R shoulder pain, and to re-evaluate heartburn for which patient was taking Famotidine. \par \par At today's visit, the patient reports that her chest pain has completely resolved. She was told by her cardiologist to come here for GI referral due to possible GI source of her pain, however she no longer c/o chest discomfort. Her heartburn has also resolved and she is no longer taking Famotidine. She has been getting PT 2x per week (started beginning of Feb) for her R shoulder pain and had a XR that showed no osseous changes or fracture and revealed only degenerative changes of the right AC and glenohumeral joints. \par \par She complains of b/l heel pain x 1 month that occurs only with ambulation. She also complains of insomnia and states that she has difficulty initiating sleep, sleeping on avg 1-2 hours per night. She does not want medication however and requests sleep therapy.

## 2022-03-02 NOTE — PHYSICAL EXAM
[Normal Sclera/Conjunctiva] : normal sclera/conjunctiva [Normal] : normal rate, regular rhythm, normal S1 and S2 and no murmur heard [No Edema] : there was no peripheral edema [de-identified] : b/l point tenderness to Achilles tendon

## 2022-03-07 ENCOUNTER — OUTPATIENT (OUTPATIENT)
Dept: OUTPATIENT SERVICES | Facility: HOSPITAL | Age: 72
LOS: 1 days | Discharge: HOME | End: 2022-03-07

## 2022-03-07 ENCOUNTER — RESULT REVIEW (OUTPATIENT)
Age: 72
End: 2022-03-07

## 2022-03-07 ENCOUNTER — APPOINTMENT (OUTPATIENT)
Dept: PODIATRY | Facility: CLINIC | Age: 72
End: 2022-03-07
Payer: MEDICAID

## 2022-03-07 ENCOUNTER — OUTPATIENT (OUTPATIENT)
Dept: OUTPATIENT SERVICES | Facility: HOSPITAL | Age: 72
LOS: 1 days | Discharge: HOME | End: 2022-03-07
Payer: MEDICAID

## 2022-03-07 DIAGNOSIS — M79.671 PAIN IN RIGHT FOOT: ICD-10-CM

## 2022-03-07 DIAGNOSIS — M79.672 PAIN IN LEFT FOOT: ICD-10-CM

## 2022-03-07 DIAGNOSIS — M79.673 PAIN IN UNSPECIFIED FOOT: ICD-10-CM

## 2022-03-07 DIAGNOSIS — M72.2 PLANTAR FASCIAL FIBROMATOSIS: ICD-10-CM

## 2022-03-07 PROCEDURE — 99203 OFFICE O/P NEW LOW 30 MIN: CPT

## 2022-03-07 PROCEDURE — 73630 X-RAY EXAM OF FOOT: CPT | Mod: 26,50

## 2022-03-08 DIAGNOSIS — M79.673 PAIN IN UNSPECIFIED FOOT: ICD-10-CM

## 2022-03-08 DIAGNOSIS — I10 ESSENTIAL (PRIMARY) HYPERTENSION: ICD-10-CM

## 2022-03-08 DIAGNOSIS — Z00.00 ENCOUNTER FOR GENERAL ADULT MEDICAL EXAMINATION WITHOUT ABNORMAL FINDINGS: ICD-10-CM

## 2022-03-08 DIAGNOSIS — E78.5 HYPERLIPIDEMIA, UNSPECIFIED: ICD-10-CM

## 2022-03-09 ENCOUNTER — APPOINTMENT (OUTPATIENT)
Dept: INTERNAL MEDICINE | Facility: CLINIC | Age: 72
End: 2022-03-09

## 2022-03-10 NOTE — PHYSICAL EXAM
[General Appearance - Alert] : alert [General Appearance - In No Acute Distress] : in no acute distress [Ankle Swelling (On Exam)] : not present [Varicose Veins Of Lower Extremities] : not present [Delayed in the Right Toes] : capillary refills normal in right toes [Delayed in the Left Toes] : capillary refills normal in the left toes [2+] : left foot dorsalis pedis 2+ [No Joint Swelling] : no joint swelling [Normal Foot/Ankle] : Both lower extremities were exposed and visualized. Standing exam demonstrates normal foot posture and alignment. Hindfoot exam shows no hindfoot valgus or varus [de-identified] : pain on plapation of medial tubercle of bilateral heels  [Skin Color & Pigmentation] : normal skin color and pigmentation [] : no rash [Skin Turgor] : normal skin turgor [Skin Lesions] : no skin lesions [Foot Ulcer] : no foot ulcer [Skin Induration] : no skin induration [Sensation] : the sensory exam was normal to light touch and pinprick [No Focal Deficits] : no focal deficits [Deep Tendon Reflexes (DTR)] : deep tendon reflexes were 2+ and symmetric [Motor Exam] : the motor exam was normal

## 2022-03-10 NOTE — ASSESSMENT
[FreeTextEntry1] : Patient examined, history and chart reviewed\par \par Discussed PF in detail \par \par Pt refused injection for right and left foot.  \par \par Rx: Radiograph b/l feet. \par \par Rx: Physical therapy. \par \par Pt to follow up in 1 month.

## 2022-03-24 ENCOUNTER — APPOINTMENT (OUTPATIENT)
Dept: INTERNAL MEDICINE | Facility: CLINIC | Age: 72
End: 2022-03-24

## 2022-04-04 ENCOUNTER — OUTPATIENT (OUTPATIENT)
Dept: OUTPATIENT SERVICES | Facility: HOSPITAL | Age: 72
LOS: 1 days | Discharge: HOME | End: 2022-04-04

## 2022-04-04 ENCOUNTER — APPOINTMENT (OUTPATIENT)
Dept: PODIATRY | Facility: CLINIC | Age: 72
End: 2022-04-04
Payer: MEDICAID

## 2022-04-04 VITALS — BODY MASS INDEX: 28.89 KG/M2 | WEIGHT: 153 LBS | HEIGHT: 61 IN

## 2022-04-04 DIAGNOSIS — M79.671 PAIN IN RIGHT FOOT: ICD-10-CM

## 2022-04-04 DIAGNOSIS — M76.61 ACHILLES TENDINITIS, RIGHT LEG: ICD-10-CM

## 2022-04-04 DIAGNOSIS — M79.672 PAIN IN LEFT FOOT: ICD-10-CM

## 2022-04-04 DIAGNOSIS — M79.673 PAIN IN UNSPECIFIED FOOT: ICD-10-CM

## 2022-04-04 PROCEDURE — 99213 OFFICE O/P EST LOW 20 MIN: CPT

## 2022-04-11 ENCOUNTER — RESULT CHARGE (OUTPATIENT)
Age: 72
End: 2022-04-11

## 2022-04-11 ENCOUNTER — APPOINTMENT (OUTPATIENT)
Dept: CARDIOLOGY | Facility: CLINIC | Age: 72
End: 2022-04-11
Payer: MEDICAID

## 2022-04-11 VITALS
HEART RATE: 68 BPM | SYSTOLIC BLOOD PRESSURE: 115 MMHG | HEIGHT: 61 IN | BODY MASS INDEX: 28.7 KG/M2 | WEIGHT: 152 LBS | DIASTOLIC BLOOD PRESSURE: 71 MMHG

## 2022-04-11 PROCEDURE — 99213 OFFICE O/P EST LOW 20 MIN: CPT

## 2022-04-11 PROCEDURE — 93000 ELECTROCARDIOGRAM COMPLETE: CPT

## 2022-04-11 NOTE — HISTORY OF PRESENT ILLNESS
[FreeTextEntry1] : 72 y/o Female with PMH HTN, HLD presents for f/u of palpitations and chest discomfort. Nuclear stress test was negative. Patient follows electrophysiologist for palpitations for conservative management of APCs and PVCs. Feels "hot" everywhere above the waist on her torso. Eats spicy food and a lot of tomatoes, green tea with lemon.\par Denies any chest pain, palpitations, nausea or vomiting.\par CT chest - calcium score of 0. Nuclear - normal. MCOT - Occ. PAC/PVC.ECG - normal.\par Echo - EF - 66%

## 2022-04-11 NOTE — ASSESSMENT
[FreeTextEntry1] : Chest pain - atypical\par CT Calcium score - 0\par Nuclear stress test 11/2021 - normal\par Echo 2020 - normal LV function, no wall motion abnormalities\par \par \par Palpitations - resolved\par MCOT, EP evaluation noted and appreciated\par PAC's - conservative management.\par \par BP - controlled\par \par primary prevention\par Patient was advised about healthy lifestyle changes, including diet and exercise. Importance of sustained long-term weight loss was discussed, questions answered.\par F/w with PMD to evaluate for non-cardiac etiologies of chest pain.\par GI evaluation.\par \par Return to cardiology PRN.

## 2022-04-21 ENCOUNTER — APPOINTMENT (OUTPATIENT)
Dept: INTERNAL MEDICINE | Facility: CLINIC | Age: 72
End: 2022-04-21

## 2022-04-21 NOTE — HISTORY OF PRESENT ILLNESS
[FreeTextEntry1] : 71 F complains of pain in her heels, stating only when she is walking she first noticed it 1 year ago. \par Pain on her heels.

## 2022-04-21 NOTE — PHYSICAL EXAM
[General Appearance - Alert] : alert [General Appearance - In No Acute Distress] : in no acute distress [2+] : left foot dorsalis pedis 2+ [No Joint Swelling] : no joint swelling [Normal Foot/Ankle] : Both lower extremities were exposed and visualized. Standing exam demonstrates normal foot posture and alignment. Hindfoot exam shows no hindfoot valgus or varus [Skin Color & Pigmentation] : normal skin color and pigmentation [Skin Turgor] : normal skin turgor [Skin Lesions] : no skin lesions [Sensation] : the sensory exam was normal to light touch and pinprick [No Focal Deficits] : no focal deficits [Motor Exam] : the motor exam was normal [Deep Tendon Reflexes (DTR)] : deep tendon reflexes were 2+ and symmetric [Ankle Swelling (On Exam)] : not present [Varicose Veins Of Lower Extremities] : not present [] : not present [Delayed in the Right Toes] : capillary refills normal in right toes [Delayed in the Left Toes] : capillary refills normal in the left toes [de-identified] : pain on plapation of medial tubercle of bilateral heels  [Foot Ulcer] : no foot ulcer [Skin Induration] : no skin induration

## 2022-04-21 NOTE — ASSESSMENT
[FreeTextEntry1] : Achilles Tendonitis;\par \par Patient examined, history and chart reviewed\par Discussed PF in detail \par Rx: Physical therapy. \par Follow Up PRN;

## 2022-05-12 ENCOUNTER — NON-APPOINTMENT (OUTPATIENT)
Age: 72
End: 2022-05-12

## 2022-05-12 ENCOUNTER — APPOINTMENT (OUTPATIENT)
Dept: INTERNAL MEDICINE | Facility: CLINIC | Age: 72
End: 2022-05-12
Payer: MEDICAID

## 2022-05-12 ENCOUNTER — OUTPATIENT (OUTPATIENT)
Dept: OUTPATIENT SERVICES | Facility: HOSPITAL | Age: 72
LOS: 1 days | Discharge: HOME | End: 2022-05-12

## 2022-05-12 VITALS
OXYGEN SATURATION: 99 % | SYSTOLIC BLOOD PRESSURE: 116 MMHG | TEMPERATURE: 98.2 F | DIASTOLIC BLOOD PRESSURE: 73 MMHG | BODY MASS INDEX: 28.7 KG/M2 | HEIGHT: 61 IN | HEART RATE: 74 BPM | WEIGHT: 152 LBS

## 2022-05-12 DIAGNOSIS — Z01.419 ENCOUNTER FOR GYNECOLOGICAL EXAMINATION (GENERAL) (ROUTINE) W/OUT ABNORMAL FINDINGS: ICD-10-CM

## 2022-05-12 DIAGNOSIS — N95.2 POSTMENOPAUSAL ATROPHIC VAGINITIS: ICD-10-CM

## 2022-05-12 PROCEDURE — 99397 PER PM REEVAL EST PAT 65+ YR: CPT | Mod: GC

## 2022-05-16 DIAGNOSIS — Z01.419 ENCOUNTER FOR GYNECOLOGICAL EXAMINATION (GENERAL) (ROUTINE) WITHOUT ABNORMAL FINDINGS: ICD-10-CM

## 2022-05-16 DIAGNOSIS — N39.46 MIXED INCONTINENCE: ICD-10-CM

## 2022-05-23 ENCOUNTER — APPOINTMENT (OUTPATIENT)
Dept: PODIATRY | Facility: CLINIC | Age: 72
End: 2022-05-23

## 2022-05-23 NOTE — ASSESSMENT
[FreeTextEntry1] : Assessment: Achilles tendon pain\par Plan: \par See and eval\par Cancelled today appointment due to pt needed to see Mr. Ochoa for orthotic; \par Continue physical therapy; \par F/u after  Don's appointment;

## 2022-05-23 NOTE — HISTORY OF PRESENT ILLNESS
[FreeTextEntry1] : CC: "I have Achilles tendon pain"\par HPI:\par Mrs. Barber came back for appointment after PT appointment; supposed come to see Mr. Ochoa for Orthotic, but for came in for Dr. Contreras\par appointment; will cancel today's appointment;

## 2022-06-04 PROBLEM — N95.2 VAGINAL ATROPHY: Status: ACTIVE | Noted: 2022-06-04

## 2022-06-04 NOTE — REVIEW OF SYSTEMS
[Fever] : no fever [Chills] : no chills [Dyspnea] : no dyspnea [Cough] : no cough [Chest Pain] : no chest pain [Palpitations] : no palpitations [Abdominal Pain] : no abdominal pain [Vomiting] : no vomiting [Urgency] : no urgency [Frequency] : no frequency [Incontinence] : no incontinence [Dysuria] : no dysuria [Urethral Discharge] : no urethral discharge [Abn Vaginal bleeding] : no abnormal vaginal bleeding [Pelvic pain] : no pelvic pain [Genital Rash/Irritation] : no genital rash/irritation [Skin Rash] : no skin rash [Joint Stiffness] : no joint stiffness [Back Pain] : no back pain [Headache] : no headache

## 2022-06-04 NOTE — PHYSICAL EXAM
[Chaperone Present] : A chaperone was present in the examining room during all aspects of the physical examination [Appropriately responsive] : appropriately responsive [Regular Rate Rhythm] : regular rate rhythm [Clear to Auscultation B/L] : clear to auscultation bilaterally [Soft] : soft [Non-tender] : non-tender [Oriented x3] : oriented x3 [Examination Of The Breasts] : a normal appearance [No Masses] : no breast masses were palpable [Labia Majora] : normal [Labia Minora] : normal [Discharge] :  ~M urethral discharge [Atrophy] : atrophy [Cystocele] : a cystocele [Normal] : normal [Uterine Adnexae] : normal [FreeTextEntry1] : MA and resident  [FreeTextEntry6] : Nontender, no mass, no lymphadenopathy no discharge

## 2022-06-04 NOTE — PLAN
[FreeTextEntry1] : 73yo female with PMH  presents for annual GYN visit. \par \par #Annual Gyn Visit\par - Complaint of vaginal dryness, only mild symptoms follow for now\par - Dexa scan: 2022 No osteopenia or osteoporosis \par - Mammogram 2022: Birads 1 \par - F/U pap smear results \par \par #Urge incontinence\par #Cystocele \par - F/U UA, urine culture\par - Urology referral\par

## 2022-06-06 ENCOUNTER — APPOINTMENT (OUTPATIENT)
Dept: PODIATRY | Facility: CLINIC | Age: 72
End: 2022-06-06
Payer: MEDICAID

## 2022-06-06 ENCOUNTER — OUTPATIENT (OUTPATIENT)
Dept: OUTPATIENT SERVICES | Facility: HOSPITAL | Age: 72
LOS: 1 days | Discharge: HOME | End: 2022-06-06

## 2022-06-06 DIAGNOSIS — M79.671 PAIN IN RIGHT FOOT: ICD-10-CM

## 2022-06-06 DIAGNOSIS — M79.672 PAIN IN LEFT FOOT: ICD-10-CM

## 2022-06-06 DIAGNOSIS — M76.61 ACHILLES TENDINITIS, RIGHT LEG: ICD-10-CM

## 2022-06-06 PROCEDURE — 99213 OFFICE O/P EST LOW 20 MIN: CPT

## 2022-06-06 NOTE — PROCEDURE
[] : A mold was applied. [FreeTextEntry1] : bilateral heel pain Achilles tendon pain. pt comes in today with inappropriate shoes. heel lifts fabricated for sneakers for pt to try when she goes home. any discomfort will not use. pt to return in 3 weeks with sneakers for evaluation.

## 2022-06-23 LAB
APPEARANCE: CLEAR
BACTERIA UR CULT: NORMAL
BILIRUBIN URINE: NEGATIVE
BLOOD URINE: NEGATIVE
COLOR: NORMAL
CYTOLOGY CVX/VAG DOC THIN PREP: ABNORMAL
GLUCOSE QUALITATIVE U: NEGATIVE
HPV HIGH+LOW RISK DNA PNL CVX: NOT DETECTED
KETONES URINE: NEGATIVE
LEUKOCYTE ESTERASE URINE: ABNORMAL
NITRITE URINE: NEGATIVE
PH URINE: 6
PROTEIN URINE: NEGATIVE
SPECIFIC GRAVITY URINE: 1.01
UROBILINOGEN URINE: NORMAL

## 2022-06-27 LAB
ALBUMIN SERPL ELPH-MCNC: 4.8 G/DL
ALP BLD-CCNC: 65 U/L
ALT SERPL-CCNC: 29 U/L
ANION GAP SERPL CALC-SCNC: 13 MMOL/L
AST SERPL-CCNC: 33 U/L
BASOPHILS # BLD AUTO: 0.04 K/UL
BASOPHILS NFR BLD AUTO: 1 %
BILIRUB SERPL-MCNC: 0.4 MG/DL
BUN SERPL-MCNC: 13 MG/DL
CALCIUM SERPL-MCNC: 9.3 MG/DL
CHLORIDE SERPL-SCNC: 103 MMOL/L
CHOLEST SERPL-MCNC: 198 MG/DL
CO2 SERPL-SCNC: 24 MMOL/L
CREAT SERPL-MCNC: 0.8 MG/DL
EGFR: 79 ML/MIN/1.73M2
EOSINOPHIL # BLD AUTO: 0.12 K/UL
EOSINOPHIL NFR BLD AUTO: 3 %
ESTIMATED AVERAGE GLUCOSE: 82 MG/DL
GLUCOSE SERPL-MCNC: 86 MG/DL
HBA1C MFR BLD HPLC: 4.5 %
HCT VFR BLD CALC: 38.5 %
HDLC SERPL-MCNC: 59 MG/DL
HGB BLD-MCNC: 12.3 G/DL
IMM GRANULOCYTES NFR BLD AUTO: 0.3 %
LDLC SERPL CALC-MCNC: 121 MG/DL
LYMPHOCYTES # BLD AUTO: 1.76 K/UL
LYMPHOCYTES NFR BLD AUTO: 44.7 %
MAN DIFF?: NORMAL
MCHC RBC-ENTMCNC: 29.7 PG
MCHC RBC-ENTMCNC: 31.9 G/DL
MCV RBC AUTO: 93 FL
MONOCYTES # BLD AUTO: 0.4 K/UL
MONOCYTES NFR BLD AUTO: 10.2 %
NEUTROPHILS # BLD AUTO: 1.61 K/UL
NEUTROPHILS NFR BLD AUTO: 40.8 %
NONHDLC SERPL-MCNC: 139 MG/DL
PLATELET # BLD AUTO: 210 K/UL
POTASSIUM SERPL-SCNC: 5.6 MMOL/L
PROT SERPL-MCNC: 7.9 G/DL
RBC # BLD: 4.14 M/UL
RBC # FLD: 12.8 %
SODIUM SERPL-SCNC: 140 MMOL/L
T4 FREE SERPL-MCNC: 0.9 NG/DL
TRIGL SERPL-MCNC: 88 MG/DL
TSH SERPL-ACNC: 2.33 UIU/ML
WBC # FLD AUTO: 3.94 K/UL

## 2022-06-28 ENCOUNTER — APPOINTMENT (OUTPATIENT)
Dept: PODIATRY | Facility: CLINIC | Age: 72
End: 2022-06-28

## 2022-07-20 ENCOUNTER — OUTPATIENT (OUTPATIENT)
Dept: OUTPATIENT SERVICES | Facility: HOSPITAL | Age: 72
LOS: 1 days | Discharge: HOME | End: 2022-07-20

## 2022-07-20 ENCOUNTER — APPOINTMENT (OUTPATIENT)
Dept: PODIATRY | Facility: CLINIC | Age: 72
End: 2022-07-20

## 2022-07-20 DIAGNOSIS — M79.673 PAIN IN UNSPECIFIED FOOT: ICD-10-CM

## 2022-07-20 PROCEDURE — 99212 OFFICE O/P EST SF 10 MIN: CPT

## 2022-07-21 PROBLEM — M79.673 FOOT PAIN: Status: ACTIVE | Noted: 2022-02-23

## 2022-07-25 ENCOUNTER — APPOINTMENT (OUTPATIENT)
Dept: INTERNAL MEDICINE | Facility: CLINIC | Age: 72
End: 2022-07-25

## 2022-07-25 ENCOUNTER — NON-APPOINTMENT (OUTPATIENT)
Age: 72
End: 2022-07-25

## 2022-07-25 ENCOUNTER — OUTPATIENT (OUTPATIENT)
Dept: OUTPATIENT SERVICES | Facility: HOSPITAL | Age: 72
LOS: 1 days | Discharge: HOME | End: 2022-07-25

## 2022-07-25 VITALS
WEIGHT: 154 LBS | HEART RATE: 80 BPM | OXYGEN SATURATION: 97 % | TEMPERATURE: 97.2 F | HEIGHT: 61 IN | SYSTOLIC BLOOD PRESSURE: 117 MMHG | BODY MASS INDEX: 29.07 KG/M2 | DIASTOLIC BLOOD PRESSURE: 70 MMHG

## 2022-07-25 DIAGNOSIS — M76.61 ACHILLES TENDINITIS, RIGHT LEG: ICD-10-CM

## 2022-07-25 DIAGNOSIS — M76.62 ACHILLES TENDINITIS, RIGHT LEG: ICD-10-CM

## 2022-07-25 PROCEDURE — 99214 OFFICE O/P EST MOD 30 MIN: CPT | Mod: GC

## 2022-07-25 RX ORDER — FAMOTIDINE 20 MG/1
20 TABLET, FILM COATED ORAL
Qty: 30 | Refills: 1 | Status: DISCONTINUED | COMMUNITY
Start: 2021-12-08 | End: 2022-07-25

## 2022-07-25 NOTE — HISTORY OF PRESENT ILLNESS
[FreeTextEntry1] :  follow-up [de-identified] : 71 y/o female with PMHx of HTN, HLD, APCs/PVCs, arthritis, and L eye glaucoma, here for follow-up. Patient was last seen 5 months ago. Seen cardiology since then- palpitations and chest discomfort have resolved. Still has leg and shoulder pain but improving with PT. Her bilateral heel pain has improved since working with podiatry.  \par \par Only complaint today has been pain around her waist that has been happening intermittently and started prior to COVID. It's worse on the left and is a 7/10. Tylenol helps a bit. Worsened with carrying heavy object and bending over. Denies any changes in BM movements or stool changes. \par

## 2022-07-25 NOTE — REVIEW OF SYSTEMS
[Joint Pain] : joint pain [Insomnia] : insomnia [Negative] : Constitutional [Muscle Pain] : muscle pain [Vision Problems] : no vision problems [Sore Throat] : no sore throat [Leg Claudication] : no leg claudication [Lower Ext Edema] : no lower extremity edema [Shortness Of Breath] : no shortness of breath [Cough] : no cough [Abdominal Pain] : no abdominal pain [Nausea] : no nausea [Vomiting] : no vomiting [FreeTextEntry9] : bilateral pain aroudn waist L>R

## 2022-07-25 NOTE — ASSESSMENT
[FreeTextEntry1] : 73 y/o F with PMHx of HTN, HLD, arthritis, APC/PVCs, presents for follow-up. \par \par #bilateral waist pain, most likely musculoskeletal related \par -continue Tylenol PRN \par -she will call clinic if acutely worsens\par \par # B/L heel pain: improving\par - Podiatry recommended heel inserts which have been helping\par \par # Insomnia: improving \par - continue behavioral changes such as walking and improved diet\par \par # Shoulder Pain - Resolving \par - Patient taking Tylenol as needed\par - Continue PT \par - XR R shoulder: no fracture/osseous abnormality; mild degenerative changes of right glenohumeral and AC joint \par \par # Heartburn - Resolved\par - Patient no longer taking famotidine \par - No need for GI referral at this time \par \par # Palpitations - Resolved\par # H/o APC/PVC\par - Echo in 2020 normal\par - NM stress test 12/2021 showed normal perfusion\par - TSH wnl\par - Follows with cardiology who recommended lifestyle modifcations \par - Continue aspirin\par \par # HTN\par - Continue amlodipine 5 mg QD\par \par # HLD \par - Continue pravastatin\par \par # HCM\par - Lipid profile , A1c WNL as of Jun 2022\par - Repeat blood work next year\par - Mammogram January 2022 was normal \par - Colonoscopy in 2019 at [facility] was normal\par - DEXA scan Feb 2022 normal bone mass \par - Flu shot taken in Nov 2021\par - COVID booster given January 2022\par - GYN pap smear is up to date \par \par RTC in 6 months\par \par

## 2022-07-25 NOTE — PHYSICAL EXAM
[Normal Sclera/Conjunctiva] : normal sclera/conjunctiva [Normal] : normal rate, regular rhythm, normal S1 and S2 and no murmur heard [No Edema] : there was no peripheral edema [No Acute Distress] : no acute distress [Normal TMs] : both tympanic membranes were normal [No Lymphadenopathy] : no lymphadenopathy [Normal Rate] : normal rate  [Regular Rhythm] : with a regular rhythm [Soft] : abdomen soft [Non Tender] : non-tender [Non-distended] : non-distended [No Rash] : no rash [Normal Affect] : the affect was normal [Alert and Oriented x3] : oriented to person, place, and time [Normal Insight/Judgement] : insight and judgment were intact [de-identified] : b/l point tenderness to Achilles tendon

## 2022-07-26 DIAGNOSIS — E78.5 HYPERLIPIDEMIA, UNSPECIFIED: ICD-10-CM

## 2022-07-26 DIAGNOSIS — M76.61 ACHILLES TENDINITIS, RIGHT LEG: ICD-10-CM

## 2022-07-26 DIAGNOSIS — M19.90 UNSPECIFIED OSTEOARTHRITIS, UNSPECIFIED SITE: ICD-10-CM

## 2022-07-26 DIAGNOSIS — Z00.00 ENCOUNTER FOR GENERAL ADULT MEDICAL EXAMINATION WITHOUT ABNORMAL FINDINGS: ICD-10-CM

## 2022-07-26 DIAGNOSIS — I10 ESSENTIAL (PRIMARY) HYPERTENSION: ICD-10-CM

## 2022-07-26 DIAGNOSIS — E55.9 VITAMIN D DEFICIENCY, UNSPECIFIED: ICD-10-CM

## 2022-07-28 DIAGNOSIS — M19.90 UNSPECIFIED OSTEOARTHRITIS, UNSPECIFIED SITE: ICD-10-CM

## 2022-07-28 DIAGNOSIS — M76.61 ACHILLES TENDINITIS, RIGHT LEG: ICD-10-CM

## 2022-07-28 DIAGNOSIS — M79.673 PAIN IN UNSPECIFIED FOOT: ICD-10-CM

## 2022-07-28 NOTE — PROCEDURE
[] : A mold was applied. [FreeTextEntry1] : pt c/o bilateral achilles tendon pain. heel lifts fabricated to place less tension on achilles tendon. pt expressed relief with same. pt will return if a problem arises.

## 2022-08-03 ENCOUNTER — OUTPATIENT (OUTPATIENT)
Dept: OUTPATIENT SERVICES | Facility: HOSPITAL | Age: 72
LOS: 1 days | Discharge: HOME | End: 2022-08-03

## 2022-08-03 DIAGNOSIS — M19.011 PRIMARY OSTEOARTHRITIS, RIGHT SHOULDER: ICD-10-CM

## 2022-08-03 DIAGNOSIS — M76.61 ACHILLES TENDINITIS, RIGHT LEG: ICD-10-CM

## 2022-08-09 ENCOUNTER — EMERGENCY (EMERGENCY)
Facility: HOSPITAL | Age: 72
LOS: 0 days | Discharge: HOME | End: 2022-08-10
Attending: EMERGENCY MEDICINE | Admitting: EMERGENCY MEDICINE

## 2022-08-09 VITALS
TEMPERATURE: 98 F | HEART RATE: 80 BPM | RESPIRATION RATE: 19 BRPM | DIASTOLIC BLOOD PRESSURE: 68 MMHG | SYSTOLIC BLOOD PRESSURE: 157 MMHG | OXYGEN SATURATION: 98 % | WEIGHT: 154.1 LBS

## 2022-08-09 DIAGNOSIS — E78.5 HYPERLIPIDEMIA, UNSPECIFIED: ICD-10-CM

## 2022-08-09 DIAGNOSIS — R07.9 CHEST PAIN, UNSPECIFIED: ICD-10-CM

## 2022-08-09 DIAGNOSIS — R07.89 OTHER CHEST PAIN: ICD-10-CM

## 2022-08-09 DIAGNOSIS — Z79.82 LONG TERM (CURRENT) USE OF ASPIRIN: ICD-10-CM

## 2022-08-09 DIAGNOSIS — H40.9 UNSPECIFIED GLAUCOMA: ICD-10-CM

## 2022-08-09 DIAGNOSIS — I10 ESSENTIAL (PRIMARY) HYPERTENSION: ICD-10-CM

## 2022-08-09 DIAGNOSIS — Z20.822 CONTACT WITH AND (SUSPECTED) EXPOSURE TO COVID-19: ICD-10-CM

## 2022-08-09 LAB
ALBUMIN SERPL ELPH-MCNC: 4.9 G/DL — SIGNIFICANT CHANGE UP (ref 3.5–5.2)
ALP SERPL-CCNC: 70 U/L — SIGNIFICANT CHANGE UP (ref 30–115)
ALT FLD-CCNC: 21 U/L — SIGNIFICANT CHANGE UP (ref 0–41)
ANION GAP SERPL CALC-SCNC: 12 MMOL/L — SIGNIFICANT CHANGE UP (ref 7–14)
AST SERPL-CCNC: 29 U/L — SIGNIFICANT CHANGE UP (ref 0–41)
BASOPHILS # BLD AUTO: 0.06 K/UL — SIGNIFICANT CHANGE UP (ref 0–0.2)
BASOPHILS NFR BLD AUTO: 1.2 % — HIGH (ref 0–1)
BILIRUB SERPL-MCNC: 0.2 MG/DL — SIGNIFICANT CHANGE UP (ref 0.2–1.2)
BUN SERPL-MCNC: 16 MG/DL — SIGNIFICANT CHANGE UP (ref 10–20)
CALCIUM SERPL-MCNC: 10 MG/DL — SIGNIFICANT CHANGE UP (ref 8.5–10.1)
CHLORIDE SERPL-SCNC: 102 MMOL/L — SIGNIFICANT CHANGE UP (ref 98–110)
CO2 SERPL-SCNC: 22 MMOL/L — SIGNIFICANT CHANGE UP (ref 17–32)
CREAT SERPL-MCNC: 0.8 MG/DL — SIGNIFICANT CHANGE UP (ref 0.7–1.5)
EGFR: 79 ML/MIN/1.73M2 — SIGNIFICANT CHANGE UP
EOSINOPHIL # BLD AUTO: 0.17 K/UL — SIGNIFICANT CHANGE UP (ref 0–0.7)
EOSINOPHIL NFR BLD AUTO: 3.5 % — SIGNIFICANT CHANGE UP (ref 0–8)
GLUCOSE SERPL-MCNC: 91 MG/DL — SIGNIFICANT CHANGE UP (ref 70–99)
HCT VFR BLD CALC: 36.8 % — LOW (ref 37–47)
HGB BLD-MCNC: 12.4 G/DL — SIGNIFICANT CHANGE UP (ref 12–16)
IMM GRANULOCYTES NFR BLD AUTO: 0.2 % — SIGNIFICANT CHANGE UP (ref 0.1–0.3)
LIDOCAIN IGE QN: 20 U/L — SIGNIFICANT CHANGE UP (ref 7–60)
LYMPHOCYTES # BLD AUTO: 2.02 K/UL — SIGNIFICANT CHANGE UP (ref 1.2–3.4)
LYMPHOCYTES # BLD AUTO: 41.6 % — SIGNIFICANT CHANGE UP (ref 20.5–51.1)
MAGNESIUM SERPL-MCNC: 2.1 MG/DL — SIGNIFICANT CHANGE UP (ref 1.8–2.4)
MCHC RBC-ENTMCNC: 29.4 PG — SIGNIFICANT CHANGE UP (ref 27–31)
MCHC RBC-ENTMCNC: 33.7 G/DL — SIGNIFICANT CHANGE UP (ref 32–37)
MCV RBC AUTO: 87.2 FL — SIGNIFICANT CHANGE UP (ref 81–99)
MONOCYTES # BLD AUTO: 0.51 K/UL — SIGNIFICANT CHANGE UP (ref 0.1–0.6)
MONOCYTES NFR BLD AUTO: 10.5 % — HIGH (ref 1.7–9.3)
NEUTROPHILS # BLD AUTO: 2.08 K/UL — SIGNIFICANT CHANGE UP (ref 1.4–6.5)
NEUTROPHILS NFR BLD AUTO: 43 % — SIGNIFICANT CHANGE UP (ref 42.2–75.2)
NRBC # BLD: 0 /100 WBCS — SIGNIFICANT CHANGE UP (ref 0–0)
NT-PROBNP SERPL-SCNC: 36 PG/ML — SIGNIFICANT CHANGE UP (ref 0–300)
PLATELET # BLD AUTO: 248 K/UL — SIGNIFICANT CHANGE UP (ref 130–400)
POTASSIUM SERPL-MCNC: 4.4 MMOL/L — SIGNIFICANT CHANGE UP (ref 3.5–5)
POTASSIUM SERPL-SCNC: 4.4 MMOL/L — SIGNIFICANT CHANGE UP (ref 3.5–5)
PROT SERPL-MCNC: 8.4 G/DL — HIGH (ref 6–8)
RBC # BLD: 4.22 M/UL — SIGNIFICANT CHANGE UP (ref 4.2–5.4)
RBC # FLD: 12.4 % — SIGNIFICANT CHANGE UP (ref 11.5–14.5)
SARS-COV-2 RNA SPEC QL NAA+PROBE: SIGNIFICANT CHANGE UP
SODIUM SERPL-SCNC: 136 MMOL/L — SIGNIFICANT CHANGE UP (ref 135–146)
TROPONIN T SERPL-MCNC: <0.01 NG/ML — SIGNIFICANT CHANGE UP
TROPONIN T SERPL-MCNC: <0.01 NG/ML — SIGNIFICANT CHANGE UP
WBC # BLD: 4.85 K/UL — SIGNIFICANT CHANGE UP (ref 4.8–10.8)
WBC # FLD AUTO: 4.85 K/UL — SIGNIFICANT CHANGE UP (ref 4.8–10.8)

## 2022-08-09 PROCEDURE — 71045 X-RAY EXAM CHEST 1 VIEW: CPT | Mod: 26

## 2022-08-09 PROCEDURE — 99218: CPT | Mod: 25

## 2022-08-09 PROCEDURE — 93010 ELECTROCARDIOGRAM REPORT: CPT

## 2022-08-09 PROCEDURE — 76937 US GUIDE VASCULAR ACCESS: CPT | Mod: 26

## 2022-08-09 PROCEDURE — 36000 PLACE NEEDLE IN VEIN: CPT

## 2022-08-09 PROCEDURE — 75571 CT HRT W/O DYE W/CA TEST: CPT | Mod: 26,MA

## 2022-08-09 PROCEDURE — 93010 ELECTROCARDIOGRAM REPORT: CPT | Mod: 77

## 2022-08-09 RX ORDER — LABETALOL HCL 100 MG
10 TABLET ORAL ONCE
Refills: 0 | Status: DISCONTINUED | OUTPATIENT
Start: 2022-08-09 | End: 2022-08-09

## 2022-08-09 RX ORDER — ADENOSINE 3 MG/ML
60 INJECTION INTRAVENOUS ONCE
Refills: 0 | Status: DISCONTINUED | OUTPATIENT
Start: 2022-08-09 | End: 2022-08-10

## 2022-08-09 RX ORDER — METOPROLOL TARTRATE 50 MG
100 TABLET ORAL ONCE
Refills: 0 | Status: COMPLETED | OUTPATIENT
Start: 2022-08-09 | End: 2022-08-09

## 2022-08-09 RX ORDER — METOPROLOL TARTRATE 50 MG
5 TABLET ORAL ONCE
Refills: 0 | Status: COMPLETED | OUTPATIENT
Start: 2022-08-09 | End: 2022-08-09

## 2022-08-09 RX ADMIN — Medication 5 MILLIGRAM(S): at 13:12

## 2022-08-09 RX ADMIN — Medication 100 MILLIGRAM(S): at 08:52

## 2022-08-09 RX ADMIN — Medication 100 MILLIGRAM(S): at 09:32

## 2022-08-09 NOTE — ED PROVIDER NOTE - PHYSICAL EXAMINATION
PHYSICAL EXAM:    GENERAL: Alert, appears stated age, well appearing, non-toxic  SKIN: Warm, pink and dry. MMM.   HEAD: NC, AT   EYE: Normal lids/conjunctiva  ENT: Normal hearing, patent oropharynx   NECK: +supple. No meningismus, or JVD  Pulm: Bilateral BS, normal resp effort, no wheezes, stridor, or retractions  CV: RRR, no M/R/G, 2+and = radial pulses  Abd: soft, non-tender, non-distended, no rebound/guarding. no CVA tenderness.   Mskel: no erythema, cyanosis, edema. no calf tenderness  Neuro: AAOx3,  normal gait.

## 2022-08-09 NOTE — ED PROVIDER NOTE - ATTENDING APP SHARED VISIT CONTRIBUTION OF CARE
72F non-smoker pmh htn, hl p/w CP x 3d. Substernal, tightness, non-radiating, intermitt, sx worse/more persistent today. No dizziness, sob/young, cough, nv, abd pain, flank pain, LE edema, rash. PCP Angeline. EMR reviewed - nml nuc stress Dec 2021.    PE:  elderly f nad  skin warm, dry  ncat  neck supple  rrr nl s1s2 no mrg  ctab no wrr  abd soft ntnd no palpable masses no rgr  back non-tender no cvat  ext no cce dpi  neuro aaox3 grossly nf exam

## 2022-08-09 NOTE — ED CDU PROVIDER INITIAL DAY NOTE - OBJECTIVE STATEMENT
72-year-old female with PMH HTN, HLD, glaucoma presents with moderate constant throbbing chest pressure x3 days.   No palliating/provoking factors.  No exertional component, SOB, nausea, vomiting, diaphoresis, fever, cough, congestion, runny nose, leg pain or swelling, any other symptom.  Stress test 1 year ago--normal.  Does not recall the name of cardio, but he is from [address] [place] [address].

## 2022-08-09 NOTE — ED CDU PROVIDER INITIAL DAY NOTE - PROGRESS NOTE DETAILS
We were unable to achieve HR less than 65 despite giving max amount of antihypertensive. Patient did not get full CCTA, only non-contrast part. Her calcium score was deemed a 0. Discussed case with cardiology fellow Dr. Jefferson who works with patient's cardiologist Dr. Chopra. Although she had a normal stress test 1 year ago, her current chest pain is different in characteristic and thus she warrants another stress test during this observation unit stay. Patient notified and agreed to the test.

## 2022-08-09 NOTE — ED PROVIDER NOTE - NS ED ATTENDING STATEMENT MOD
This was a shared visit with the XIOMARA. I reviewed and verified the documentation and independently performed the documented:

## 2022-08-09 NOTE — ED PROVIDER NOTE - OBJECTIVE STATEMENT
72-year-old female with PMH HTN, HLD, glaucoma presents with moderate constant throbbing chest pressure x3 days.   No palliating/provoking factors.  No exertional component, SOB, nausea, vomiting, diaphoresis, fever, cough, congestion, runny nose, leg pain or swelling, any other symptom.  Stress test 1 year ago--normal.  Does not recall the name of cardio, but he is from [address] [place] [address].

## 2022-08-09 NOTE — ED CDU PROVIDER INITIAL DAY NOTE - ATTENDING APP SHARED VISIT CONTRIBUTION OF CARE
72-year-old female PMH HTN, elevated cholesterol placed in the observation unit for chest pain work-up.  Patient reported that last night she felt anterior chest pressure/discomfort which prompted ED visit.  Pain was nonradiating, no clear  aggravating or alleviating factors, no associated dizziness /lightheadedness, shortness of breath, focal weakness/paresthesias or any other additional complaints.  Well-appearing well-nourished, NAD, head AT/NC, PERRL, pink conjunctivae,  mmm, nml oropharynx, nml phonation without drooling or trismus, supple neck without midline spine ttp, nml work of breathing, lungs CTA b/l, equal air entry, RRR, well-perfused extremities, distal pulses intact, abdomen soft, NT/ND, BS present in all quadrants, no midline spine or CVA ttp, no leg edema or unilateral calf swelling, A&Ox3, no focal neuro deficits, nml mood and affect.  Stress test ordered.

## 2022-08-10 VITALS
RESPIRATION RATE: 18 BRPM | TEMPERATURE: 98 F | OXYGEN SATURATION: 98 % | HEART RATE: 63 BPM | SYSTOLIC BLOOD PRESSURE: 116 MMHG | DIASTOLIC BLOOD PRESSURE: 64 MMHG

## 2022-08-10 PROCEDURE — 93016 CV STRESS TEST SUPVJ ONLY: CPT

## 2022-08-10 PROCEDURE — 99217: CPT

## 2022-08-10 PROCEDURE — 78452 HT MUSCLE IMAGE SPECT MULT: CPT | Mod: 26,MA

## 2022-08-10 PROCEDURE — 93018 CV STRESS TEST I&R ONLY: CPT

## 2022-08-10 RX ORDER — HYDROXYZINE HCL 10 MG
1 TABLET ORAL
Qty: 9 | Refills: 0
Start: 2022-08-10 | End: 2022-08-12

## 2022-08-10 RX ORDER — HYDROXYZINE HCL 10 MG
25 TABLET ORAL ONCE
Refills: 0 | Status: COMPLETED | OUTPATIENT
Start: 2022-08-10 | End: 2022-08-10

## 2022-08-10 RX ADMIN — Medication 25 MILLIGRAM(S): at 02:00

## 2022-08-10 NOTE — ED ADULT NURSE REASSESSMENT NOTE - NS ED NURSE REASSESS COMMENT FT1
received att - a&o x3, on cm sr noted, lsc, abd soft, nt/nd, denies c/o att - VS as noted, # 18 r fa h/l site wnl, flushes w/ ease/no blood return - -walks w/o assist, will continue to monitor, NPO after mn for am tests received att - a&o x3, on cm sr noted, lsc, abd soft, nt/nd, denies c/o att - VS as noted, # 22 r hand h/l site wnl, flushes w/ ease/no blood return, # l ac h/l site wnl, flushes w/ease/no blood return - walks w/o assist, will continue to monitor, NPO after mn for am tests

## 2022-08-10 NOTE — ED CDU PROVIDER DISPOSITION NOTE - PATIENT PORTAL LINK FT
You can access the FollowMyHealth Patient Portal offered by [facility] by registering at the following website: [email or web address]. By joining [facility] FollowMyHealth portal, you will also be able to view your health information using other applications (apps) compatible with our system.

## 2022-08-10 NOTE — ED CDU PROVIDER SUBSEQUENT DAY NOTE - PROGRESS NOTE DETAILS
Pt resting comfortably, denies complaints at this time. Awaiting stress test this morning. Patient was endorsed to me this morning to follow-up imaging and dispo.  Patient appears very well, denies any complaints at present, exam is unremarkable, will follow-up stress test results. Stress test is negative.  Stable for discharge home, advised to follow-up with her cardiologist, Dr. Chopra, this week. Patient to be discharged from ED. Any available test results were discussed with patient and/or family. Verbal instructions given, including instructions to return to ED immediately for any new, worsening, or concerning symptoms. Patient endorsed understanding. Written discharge instructions additionally given, including follow-up plan.  Patient was given opportunity to ask questions.

## 2022-08-10 NOTE — ED CDU PROVIDER SUBSEQUENT DAY NOTE - MEDICAL DECISION MAKING DETAILS
Patient with vague anterior chest pain.  Calcium score 0, EKG nonischemic, negative troponin, negative stress test.  Stable for discharge home.

## 2022-08-10 NOTE — ED ADULT NURSE REASSESSMENT NOTE - NS ED NURSE REASSESS COMMENT FT1
Pt a&ox3 received pt form previous rn. IVL in place. CB in reach Pt on RA. no c.o pain, no s/s distress. plan of care updated, will cont to monitor

## 2022-08-10 NOTE — ED CDU PROVIDER DISPOSITION NOTE - CLINICAL COURSE
Patient placed in the observation unit for chest pain work-up.  Testing including EKG, chest x-ray, labs, serial cardiac enzymes and a stress test.  Testing was negative, patient had a CCTA with calcium score of 0 (heart rate could not be controlled for a full CCTA work-up).  Patient had an uneventful EDOU stay, is asymptomatic, stable for discharge home.  Already has a cardiologist, advised to follow-up with him ( Dr Chopra)  this week.  Strict return precautions given.

## 2022-08-10 NOTE — ED CDU PROVIDER DISPOSITION NOTE - CARE PROVIDER_API CALL
Naun Chopra (MD)  Cardiovascular Disease; Internal Medicine; Interventional Cardiology  [address] [place] [address]  Phone: [phone]  Fax: [phone]  Follow Up Time: 1-3 Days

## 2022-08-15 ENCOUNTER — NON-APPOINTMENT (OUTPATIENT)
Age: 72
End: 2022-08-15

## 2022-08-17 ENCOUNTER — APPOINTMENT (OUTPATIENT)
Dept: CARDIOLOGY | Facility: CLINIC | Age: 72
End: 2022-08-17

## 2022-08-17 ENCOUNTER — OUTPATIENT (OUTPATIENT)
Dept: OUTPATIENT SERVICES | Facility: HOSPITAL | Age: 72
LOS: 1 days | Discharge: HOME | End: 2022-08-17

## 2022-08-17 ENCOUNTER — NON-APPOINTMENT (OUTPATIENT)
Age: 72
End: 2022-08-17

## 2022-08-17 ENCOUNTER — APPOINTMENT (OUTPATIENT)
Dept: INTERNAL MEDICINE | Facility: CLINIC | Age: 72
End: 2022-08-17

## 2022-08-17 VITALS
HEIGHT: 61 IN | SYSTOLIC BLOOD PRESSURE: 113 MMHG | HEART RATE: 65 BPM | BODY MASS INDEX: 28.7 KG/M2 | TEMPERATURE: 98.8 F | WEIGHT: 152 LBS | OXYGEN SATURATION: 97 % | DIASTOLIC BLOOD PRESSURE: 73 MMHG

## 2022-08-17 PROCEDURE — 99214 OFFICE O/P EST MOD 30 MIN: CPT | Mod: GC

## 2022-08-17 NOTE — HISTORY OF PRESENT ILLNESS
[FreeTextEntry1] : Post ED discharge  [de-identified] : 73 yo F with PMHx of HTN, HLD, APCs/PVCs, arthritis, Jehova Witness and L eye glaucoma, here for follow-up post hospital discharge. \par Last seen July 2022, and at that time pt c/o intermittent pain around waist post covid-19. Follows with cardiology (Dr Chopra) for palpitations. Pt was seen in the ED 8/9-10 for palpitations and chest tightness. States she was unable to sleep x3 days, for unknown reason and the palpitations began.  In the ED, she had (-) trop x2, normal EKG x2, had normal CT heart and nuclear stress test. Rx Atarax and told to follow up with Dr Chopra (cardio and PMD). States Atarax has help. Endorses previously taking Atenolol, which had to be stopped in 2018 due to fall with bradycardia. Denies palpitations, c/p or other symptoms.  \par \par \par

## 2022-08-17 NOTE — REVIEW OF SYSTEMS
[Fever] : no fever [Fatigue] : no fatigue [Chest Pain] : no chest pain [Palpitations] : no palpitations [Lower Ext Edema] : no lower extremity edema [Shortness Of Breath] : no shortness of breath [Cough] : no cough [Abdominal Pain] : no abdominal pain [Nausea] : no nausea [Diarrhea] : diarrhea [Heartburn] : no heartburn

## 2022-08-17 NOTE — ASSESSMENT
[FreeTextEntry1] : 73 yo F with PMHx of HTN, HLD, APCs/PVCs, arthritis, Jehovas Witness and L eye glaucoma, here for follow-up post ED discharge 8/10/22. Seen in the ED for chest pain and palpitations. \par \par # Palpitations \par # H/o APC/PVC\par - Palpitations could be due to anxiety\par - Echo in 2020 normal\par - NM stress test 12/2021 showed normal perfusion\par - Nuclear stress test 8/10/22 normal \par - CT heart 8/9/22 normal \par - TSH wnl\par - Follows with cardiology who recommended lifestyle modifications, has appt Sept 2022\par - C/w aspirin\par - C/w Atarax \par \par # bilateral waist pain, unresolved \par -most likely musculoskeletal related\par -continue Tylenol PRN \par -she will call clinic if acutely worsens\par \par # B/L heel pain: improving\par - Podiatry recommended heel inserts which have been helping\par \par # Insomnia: improving \par - continue behavioral changes such as walking and improved diet\par - C/w Atarax 25m po qd \par \par # Shoulder Pain - Resolving \par - Patient taking Tylenol as needed\par - Continue PT \par - XR R shoulder: no fracture/osseous abnormality; mild degenerative changes of right glenohumeral and AC joint \par \par # Heartburn - Resolved\par - Patient no longer taking famotidine \par - No need for GI referral at this time \par \par # HTN\par - 113/73\par - Continue amlodipine 5 mg QD\par \par # HLD \par - Continue pravastatin\par \par # HCM\par - Lipid profile , A1c WNL as of Jun 2022\par - Repeat blood work next year\par - Mammogram January 2022 was normal \par - Colonoscopy in 2019 at [facility] was normal\par - DEXA scan Feb 2022 normal bone mass \par - Flu shot taken in Nov 2021\par - COVID booster given January 2022\par - GYN pap smear is up to date \par \par RTC in 6 months\par

## 2022-08-17 NOTE — PHYSICAL EXAM
[No Acute Distress] : no acute distress [Well Nourished] : well nourished [Well Developed] : well developed [Well-Appearing] : well-appearing [No Respiratory Distress] : no respiratory distress  [No Accessory Muscle Use] : no accessory muscle use [Normal Rate] : normal rate  [Regular Rhythm] : with a regular rhythm [Normal S1, S2] : normal S1 and S2 [No Edema] : there was no peripheral edema [Soft] : abdomen soft [Non Tender] : non-tender [Non-distended] : non-distended [Normal Affect] : the affect was normal [Alert and Oriented x3] : oriented to person, place, and time [Normal Mood] : the mood was normal [Normal Insight/Judgement] : insight and judgment were intact

## 2022-08-22 DIAGNOSIS — M19.90 UNSPECIFIED OSTEOARTHRITIS, UNSPECIFIED SITE: ICD-10-CM

## 2022-08-22 DIAGNOSIS — E78.5 HYPERLIPIDEMIA, UNSPECIFIED: ICD-10-CM

## 2022-08-22 DIAGNOSIS — E55.9 VITAMIN D DEFICIENCY, UNSPECIFIED: ICD-10-CM

## 2022-08-22 DIAGNOSIS — R00.2 PALPITATIONS: ICD-10-CM

## 2022-08-23 DIAGNOSIS — M76.61 ACHILLES TENDINITIS, RIGHT LEG: ICD-10-CM

## 2022-09-12 ENCOUNTER — APPOINTMENT (OUTPATIENT)
Dept: CARDIOLOGY | Facility: CLINIC | Age: 72
End: 2022-09-12

## 2022-09-12 ENCOUNTER — NON-APPOINTMENT (OUTPATIENT)
Age: 72
End: 2022-09-12

## 2022-09-12 VITALS
HEART RATE: 75 BPM | BODY MASS INDEX: 29.07 KG/M2 | HEIGHT: 61 IN | DIASTOLIC BLOOD PRESSURE: 80 MMHG | WEIGHT: 154 LBS | SYSTOLIC BLOOD PRESSURE: 130 MMHG

## 2022-09-12 PROCEDURE — 99213 OFFICE O/P EST LOW 20 MIN: CPT | Mod: 25

## 2022-09-12 PROCEDURE — 93000 ELECTROCARDIOGRAM COMPLETE: CPT

## 2022-09-12 NOTE — HISTORY OF PRESENT ILLNESS
[FreeTextEntry1] : 73 y/o Female with PMH HTN, HLD presents for f/u of palpitations and chest discomfort. Nuclear stress test was negative. Occasional APCs and PVCs. Feels "hot" everywhere above the waist on her torso. Eats spicy food and a lot of tomatoes, green tea with lemon.\par Denies any chest pain, palpitations, nausea or vomiting.\par CT chest - calcium score of 0. Nuclear - normal. MCOT - Occ. PAC/PVC.ECG - normal.\par Echo - EF - 66%

## 2022-11-11 ENCOUNTER — APPOINTMENT (OUTPATIENT)
Dept: UROGYNECOLOGY | Facility: CLINIC | Age: 72
End: 2022-11-11

## 2022-11-11 ENCOUNTER — OUTPATIENT (OUTPATIENT)
Dept: OUTPATIENT SERVICES | Facility: HOSPITAL | Age: 72
LOS: 1 days | Discharge: HOME | End: 2022-11-11

## 2022-11-11 VITALS — BODY MASS INDEX: 29.48 KG/M2 | DIASTOLIC BLOOD PRESSURE: 62 MMHG | WEIGHT: 156 LBS | SYSTOLIC BLOOD PRESSURE: 100 MMHG

## 2022-11-11 DIAGNOSIS — N81.10 CYSTOCELE, UNSPECIFIED: ICD-10-CM

## 2022-11-11 DIAGNOSIS — Z78.9 OTHER SPECIFIED HEALTH STATUS: ICD-10-CM

## 2022-11-11 DIAGNOSIS — R39.15 URGENCY OF URINATION: ICD-10-CM

## 2022-11-11 DIAGNOSIS — N39.46 MIXED INCONTINENCE: ICD-10-CM

## 2022-11-11 DIAGNOSIS — R35.0 FREQUENCY OF MICTURITION: ICD-10-CM

## 2022-11-11 DIAGNOSIS — R35.1 NOCTURIA: ICD-10-CM

## 2022-11-11 PROCEDURE — 99205 OFFICE O/P NEW HI 60 MIN: CPT | Mod: 25

## 2022-11-11 PROCEDURE — 51701 INSERT BLADDER CATHETER: CPT

## 2022-11-11 RX ORDER — PROPYLENE GLYCOL 0.6 G/100ML
0.6 LIQUID OPHTHALMIC
Qty: 15 | Refills: 0 | Status: DISCONTINUED | COMMUNITY
Start: 2022-11-09

## 2022-11-11 RX ORDER — LATANOPROST/PF 0.005 %
0.01 DROPS OPHTHALMIC (EYE)
Qty: 2 | Refills: 0 | Status: ACTIVE | COMMUNITY
Start: 2022-10-18

## 2022-11-11 RX ORDER — ASPIRIN 325 MG
0.5 TABLET ORAL
Qty: 30 | Refills: 0 | Status: ACTIVE | COMMUNITY
Start: 2022-11-09

## 2022-11-11 RX ORDER — MULTIVIT-MIN/FOLIC/VIT K/LYCOP 400-300MCG
25 MCG TABLET ORAL
Qty: 30 | Refills: 0 | Status: DISCONTINUED | COMMUNITY
Start: 2022-11-01

## 2022-11-11 NOTE — ASSESSMENT
[FreeTextEntry1] : Overactive bladder syndrome -\par Discussed etiology of the condition with the patient and her son over the phone (who is a psychiatrist at [employer]). Discussed management options, including first line management options consisting of diet and lifestyle modifications, second line options consisting of medications, and third line options. Reviewed PTNS, bladder botox, and Interstim. R/B/A of anticholinergics as well as b-3 agonists were reviewed with patient and her son. Patient will start with bladder diet and Gemtesa. She will call with any issues. If cannot obtain Gemtesa will try Myrbetriq. Patient would like to avoid anticholinergics due to side effect profile.\par \par Anterior vaginal wall prolapse -\par Asymptomatic. Will proceed with observation.\par \par Nocturia -\par To reduce urinary frequency at night, please restrict all fluid intake 2-3 hours prior to bedtime.\par

## 2022-11-11 NOTE — HISTORY OF PRESENT ILLNESS
[FreeTextEntry1] : 72 year para 7 ( x7) presents with complaints of frequent urination and nighttime urination since .\par \par Pelvic organ prolapse: no bulge, no pressure/heaviness\par \par Stress urinary incontinence: <1 x/week no prior incontinence procedures\par \par Overactive bladder syndrome: daily frequency 10 x/day, 5-6 x/night,  + urgency,  14 x/week UUI episodes,    no pads/day     Bladder irritants include soda,    Prior OAB meds no\par \par Voiding dysfunction: no Incomplete bladder emptying, no hesitancy\par \par Lower urinary tract/vaginal symptoms: no UTIs per year, no hematuria, no dysuria, no bladder pain\par \par 7 BM/week   no constipation   Fecal incontinence no \par \par Sexually active no   Pelvic pain no   Vaginal dryness no   LMP early 50s   PMB no\par

## 2022-11-11 NOTE — PHYSICAL EXAM
[Chaperone Present] : A chaperone was present in the examining room during all aspects of the physical examination [FreeTextEntry1] : Void: 60 cc\par \par PVR: 30 cc\par \par Urethra was prepped in sterile fashion and then a sterile 14F catheter was used by me to drain the bladder for her symptoms of urinary incontinence. Patient tolerated the procedure well\par \par + likely suburethral varicosity about 2cm proximal to urethral meatus, notender\par \par GH: 6 PB: 3 TVL: 8 C: -3 D: -5 Aa: +1 Ba: +1 Ap: -2 Bp: -2\par \par neg empty cough stress test\par \par + atrophy\par \par no urethral caruncle\par \par no  vestibular tenderness\par \par + prolapse\par \par + urethral hypermobility\par \par no pelvic floor dysfunction\par \par no urethral tenderness\par \par no bladder tenderness\par \par normal appearing cervix\par \par normal sized uterus\par \par adnexa nonpalpable\par \par good sphincter tone\par \par no enterocele\par \par good rectal squeeze\par \par intact sacral nerves\par \par 2/5 Kegel\par

## 2022-11-11 NOTE — COUNSELING
[FreeTextEntry1] : Please follow up with the physician assistant in 6-8 weeks.\par \par Please start taking Gemtesa daily. Please let us know if there any problems with your prescription.\par \par For Urgency, Frequency and Urge related incontinence.\par \par Please decrease or stop the use of the following:\par \par 1. Coffee (Caffeinated and decaffeinated)\par \par 2. Teas (Caffeinated, decaffeinated, Ice tea, and green teas\par \par 3. All sodas (Caffeinated, decaffeinated, energy drinks)\par \par 4. All carbonated drinks including seltzer water\par \par 5. All citric fruit juices\par \par 6. Water with lemon or lime\par \par 7. Spicy foods\par \par 8. Tomato Sauce based foods\par \par 9. Chocolate and chocolate containing products\par \par 10. All alcohol\par \par To reduce urinary frequency at night, please restrict all fluid intake 2-3 hours prior to bedtime.\par \par

## 2022-11-14 ENCOUNTER — APPOINTMENT (OUTPATIENT)
Dept: INTERNAL MEDICINE | Facility: CLINIC | Age: 72
End: 2022-11-14

## 2022-11-14 ENCOUNTER — OUTPATIENT (OUTPATIENT)
Dept: OUTPATIENT SERVICES | Facility: HOSPITAL | Age: 72
LOS: 1 days | Discharge: HOME | End: 2022-11-14

## 2022-11-14 VITALS
SYSTOLIC BLOOD PRESSURE: 123 MMHG | HEART RATE: 76 BPM | OXYGEN SATURATION: 98 % | TEMPERATURE: 94.7 F | BODY MASS INDEX: 28.89 KG/M2 | WEIGHT: 153 LBS | HEIGHT: 61 IN | DIASTOLIC BLOOD PRESSURE: 78 MMHG

## 2022-11-14 DIAGNOSIS — R35.1 NOCTURIA: ICD-10-CM

## 2022-11-14 DIAGNOSIS — Z00.00 ENCOUNTER FOR GENERAL ADULT MEDICAL EXAMINATION WITHOUT ABNORMAL FINDINGS: ICD-10-CM

## 2022-11-14 DIAGNOSIS — R39.15 URGENCY OF URINATION: ICD-10-CM

## 2022-11-14 DIAGNOSIS — R35.0 FREQUENCY OF MICTURITION: ICD-10-CM

## 2022-11-14 DIAGNOSIS — Z23 ENCOUNTER FOR IMMUNIZATION: ICD-10-CM

## 2022-11-14 DIAGNOSIS — N39.41 URGE INCONTINENCE: ICD-10-CM

## 2022-11-14 DIAGNOSIS — N81.10 CYSTOCELE, UNSPECIFIED: ICD-10-CM

## 2022-11-14 LAB
APPEARANCE: CLEAR
BILIRUBIN URINE: NEGATIVE
BLOOD URINE: NEGATIVE
COLOR: NORMAL
GLUCOSE QUALITATIVE U: NEGATIVE
KETONES URINE: NEGATIVE
LEUKOCYTE ESTERASE URINE: NEGATIVE
NITRITE URINE: NEGATIVE
PH URINE: 6.5
PROTEIN URINE: NEGATIVE
SPECIFIC GRAVITY URINE: 1.01
URINE CULTURE <10: NORMAL
UROBILINOGEN URINE: NORMAL

## 2022-11-14 PROCEDURE — 99214 OFFICE O/P EST MOD 30 MIN: CPT | Mod: GC

## 2022-11-14 PROCEDURE — ZZZZZ: CPT

## 2022-11-14 RX ORDER — ACETAMINOPHEN 325 MG/1
325 TABLET, FILM COATED ORAL EVERY 6 HOURS
Qty: 30 | Refills: 2 | Status: COMPLETED | COMMUNITY
Start: 2022-01-12 | End: 2022-11-14

## 2022-11-14 NOTE — PHYSICAL EXAM
[Normal Sclera/Conjunctiva] : normal sclera/conjunctiva [EOMI] : extraocular movements intact [No JVD] : no jugular venous distention [Normal] : normal rate, regular rhythm, normal S1 and S2 and no murmur heard [Soft] : abdomen soft [Non Tender] : non-tender [No Joint Swelling] : no joint swelling [Grossly Normal Strength/Tone] : grossly normal strength/tone [No Rash] : no rash [Coordination Grossly Intact] : coordination grossly intact

## 2022-11-21 NOTE — HISTORY OF PRESENT ILLNESS
[FreeTextEntry1] : Follow up [de-identified] : Pt is a 73 y/o F with PMHx of HTN, HLD, PVCs, arthritis, L eye glaucoma presents for f/u, last seen 8/17/22. Pt reports that she has been taking 2 tablets of hydroxyzine 25 mg qhs with improvement in sleep quality but wakes feeling lethargic and dizzy for 30mins-1hr. She reports that her palpitations completely resolved 9/2022 following cardio visit. She also reports L sided waist pain worse with flexion and ambulation x 2 years, improved prior with PT. She had visit with gyn uro 11/11/22 for urinary frequency and prescribed vibegron which she plans to start today. She is adherent to amlodipine with home BP ranging 110-120.

## 2022-11-21 NOTE — REVIEW OF SYSTEMS
[Negative] : Respiratory [Pain] : no pain [Redness] : no redness [Chest Pain] : no chest pain [Palpitations] : no palpitations [Lower Ext Edema] : no lower extremity edema [Abdominal Pain] : no abdominal pain [Nausea] : no nausea [Vomiting] : no vomiting [Fainting] : no fainting [FreeTextEntry8] : +urinary frequency [FreeTextEntry9] : +L waist pain

## 2022-11-21 NOTE — ASSESSMENT
[FreeTextEntry1] : 73 yo F with PMHx of HTN, HLD, APCs/PVCs, arthritis, Jehovas Witness, and L eye glaucoma, here for 3 month follow-up, last seen 8/2022.\par \par # Palpitations, resolved \par # H/o APC/PVC\par - Echo in 2020 normal\par - NM stress test 12/2021 showed normal perfusion\par - Nuclear stress test 8/10/22 normal \par - CT heart 8/9/22 normal \par - TSH wnl\par - palpitations completely resolved 9/2022\par - C/w aspirin\par - C/w Atarax 25 mg qhs\par \par #B/l waist pain, unresolved \par - daily waist pain, L > R, worse with flexion; improved prior with PT\par -most likely musculoskeletal\par -continue Tylenol PRN \par - referral to PT\par \par # Insomnia\par - continue behavioral changes such as walking and improved diet\par - C/w Atarax 25m po qd; refills sent\par \par # Shoulder Pain, resolved\par - XR R shoulder: no fracture/osseous abnormality; mild degenerative changes of right glenohumeral and AC joint \par - Tylenol as needed\par \par # HTN\par - /78 today\par - Continue amlodipine 5 mg QD\par \par # HLD \par - lipid panel 6/2022: TG 88, , HDL 59\par - Continue pravastatin\par \par # HCM\par - Lipid profile , A1c WNL as of Jun 2022\par - Repeat blood work next year\par - Mammogram January 2022: birads 1\par - Colonoscopy in 2019 at [facility] was normal; was told to f/u 2029\par - DEXA scan Feb 2022 normal\par - flu vaccine today\par - COVID booster given January 2022\par - discuss pneumococcal at next visit\par - RTC in 6 months\par

## 2022-12-05 ENCOUNTER — RESULT CHARGE (OUTPATIENT)
Age: 72
End: 2022-12-05

## 2022-12-05 ENCOUNTER — APPOINTMENT (OUTPATIENT)
Dept: CARDIOLOGY | Facility: CLINIC | Age: 72
End: 2022-12-05

## 2022-12-05 VITALS
BODY MASS INDEX: 29.45 KG/M2 | HEART RATE: 79 BPM | SYSTOLIC BLOOD PRESSURE: 118 MMHG | WEIGHT: 156 LBS | DIASTOLIC BLOOD PRESSURE: 68 MMHG | HEIGHT: 61 IN

## 2022-12-05 PROCEDURE — 93000 ELECTROCARDIOGRAM COMPLETE: CPT

## 2022-12-05 PROCEDURE — 99213 OFFICE O/P EST LOW 20 MIN: CPT | Mod: 25

## 2022-12-05 NOTE — HISTORY OF PRESENT ILLNESS
[FreeTextEntry1] : 71 y/o Female with PMH HTN, HLD presents for f/u. NO new events. Nuclear stress test was negative. Occasional APCs and PVCs. \par Denies any chest pain, palpitations, nausea or vomiting.\par CT chest - calcium score of 0. Nuclear - normal. MCOT - Occ. PAC/PVC.ECG - normal.\par Echo - EF - 66%\par BP - controlled

## 2022-12-05 NOTE — ASSESSMENT
[FreeTextEntry1] : Chest pain - atypical\par CT Calcium score - 0\par Nuclear stress test 11/2021 - normal\par Echo 2020 - normal LV function, no wall motion abnormalities\par \par \par Palpitations - resolved\par MCOT, EP evaluation noted and appreciated\par PAC's - conservative management.\par \par BP - controlled\par \par primary prevention\par Heart healthy diet\par F/w with PMD to evaluate for non-cardiac etiologies of chest pain.\par GI evaluation.\par \par Return to cardiology PRN.

## 2022-12-07 NOTE — ED CDU PROVIDER INITIAL DAY NOTE - MEDICAL DECISION MAKING DETAILS
Dear Binh    Your reported symptoms require an in-person evaluation. I recommend that you seek care at an Urgent Care. Or video visit     One of our nurses will call you to discuss your symptoms and help you find in-person care.      If you feel you may be experiencing a medical emergency, contact 911 immediately. If you have any questions about your symptoms, contact us at [phone].     Thank You,   ARCHANA Blake     Patient presents with chest pain. labs, ekg, cxr done. Placed in obs for further cardiac evaluation.

## 2023-01-05 ENCOUNTER — APPOINTMENT (OUTPATIENT)
Dept: UROGYNECOLOGY | Facility: CLINIC | Age: 73
End: 2023-01-05
Payer: MEDICAID

## 2023-01-05 VITALS
SYSTOLIC BLOOD PRESSURE: 109 MMHG | HEART RATE: 73 BPM | BODY MASS INDEX: 29.45 KG/M2 | WEIGHT: 156 LBS | DIASTOLIC BLOOD PRESSURE: 68 MMHG | HEIGHT: 61 IN

## 2023-01-05 PROCEDURE — 99213 OFFICE O/P EST LOW 20 MIN: CPT

## 2023-01-05 NOTE — DISCUSSION/SUMMARY
[FreeTextEntry1] : \par Urge Incontinence-\par Patient would like to continue Gemtesa for another month\par If Gemtesa is not helping enough for her symptoms, can consider Myrbetriq 25 mg or third line therapy options\par Patient does not want to try anticholinergics due to concern of memory loss\par Precautions reviewed.\par Will return in 1 month for follow up or earlier if she has any issues.\par \par \par

## 2023-01-05 NOTE — COUNSELING
[FreeTextEntry1] : If you feel like you have an infection it is important for you to call our office and we will arrange testing of your urine.\par \par Please continue taking Gemtesa 75 mg for frequency. Refills sent to your pharmacy.\par \par Please call my office if you have any issues with the cost or side effects of the medication. \par \par Schedule a 1 month follow up med check appointment with SHANEL Gallo.\par

## 2023-01-05 NOTE — HISTORY OF PRESENT ILLNESS
[FreeTextEntry1] : Patient is here for 8 weeks med check for urge incontinence.\par Last seen on 11/11/2022 as a new patient.\par \par + likely suburethral varicosity about 2cm proximal to urethral meatus, notender\par \par GH: 6 PB: 3 TVL: 8 C: -3 D: -5 Aa: +1 Ba: +1 Ap: -2 Bp: -2\par \par PVR 30 cc without reduction at new patient visit\par \par Gemtesa\par \par Today, patient states she is happy with Gemtesa and is noticing some improvement but is still experiencing frequency and leakage of urine. She notes that she only started it in December due to insurance coverage. Denies side effects. Patient does not feel she has an infection.\par \par Patient would like to continue Gemtesa for another month before making any changes.\par

## 2023-01-25 ENCOUNTER — OUTPATIENT (OUTPATIENT)
Dept: OUTPATIENT SERVICES | Facility: HOSPITAL | Age: 73
LOS: 1 days | Discharge: HOME | End: 2023-01-25

## 2023-01-25 ENCOUNTER — APPOINTMENT (OUTPATIENT)
Dept: INTERNAL MEDICINE | Facility: CLINIC | Age: 73
End: 2023-01-25
Payer: MEDICAID

## 2023-01-25 VITALS
DIASTOLIC BLOOD PRESSURE: 72 MMHG | HEIGHT: 61 IN | TEMPERATURE: 96.4 F | WEIGHT: 154 LBS | SYSTOLIC BLOOD PRESSURE: 128 MMHG | OXYGEN SATURATION: 98 % | HEART RATE: 81 BPM | BODY MASS INDEX: 29.07 KG/M2

## 2023-01-25 DIAGNOSIS — E55.9 VITAMIN D DEFICIENCY, UNSPECIFIED: ICD-10-CM

## 2023-01-25 LAB
ALBUMIN SERPL ELPH-MCNC: 5 G/DL
ALP BLD-CCNC: 64 U/L
ALT SERPL-CCNC: 25 U/L
ANION GAP SERPL CALC-SCNC: 15 MMOL/L
AST SERPL-CCNC: 27 U/L
BASOPHILS # BLD AUTO: 0.05 K/UL
BASOPHILS NFR BLD AUTO: 1.1 %
BILIRUB SERPL-MCNC: 0.3 MG/DL
BUN SERPL-MCNC: 15 MG/DL
CALCIUM SERPL-MCNC: 9.8 MG/DL
CHLORIDE SERPL-SCNC: 103 MMOL/L
CHOLEST SERPL-MCNC: 185 MG/DL
CO2 SERPL-SCNC: 24 MMOL/L
CREAT SERPL-MCNC: 0.9 MG/DL
EGFR: 69 ML/MIN/1.73M2
EOSINOPHIL # BLD AUTO: 0.1 K/UL
EOSINOPHIL NFR BLD AUTO: 2.3 %
ESTIMATED AVERAGE GLUCOSE: 97 MG/DL
GLUCOSE SERPL-MCNC: 87 MG/DL
HBA1C MFR BLD HPLC: 5 %
HCT VFR BLD CALC: 38 %
HDLC SERPL-MCNC: 53 MG/DL
HGB BLD-MCNC: 12.4 G/DL
IMM GRANULOCYTES NFR BLD AUTO: 0.2 %
LDLC SERPL CALC-MCNC: 115 MG/DL
LYMPHOCYTES # BLD AUTO: 1.66 K/UL
LYMPHOCYTES NFR BLD AUTO: 37.4 %
MAN DIFF?: NORMAL
MCHC RBC-ENTMCNC: 28.7 PG
MCHC RBC-ENTMCNC: 32.6 G/DL
MCV RBC AUTO: 88 FL
MONOCYTES # BLD AUTO: 0.46 K/UL
MONOCYTES NFR BLD AUTO: 10.4 %
NEUTROPHILS # BLD AUTO: 2.16 K/UL
NEUTROPHILS NFR BLD AUTO: 48.6 %
NONHDLC SERPL-MCNC: 132 MG/DL
PLATELET # BLD AUTO: 300 K/UL
POTASSIUM SERPL-SCNC: 4.3 MMOL/L
PROT SERPL-MCNC: 8.2 G/DL
RBC # BLD: 4.32 M/UL
RBC # FLD: 12.6 %
SODIUM SERPL-SCNC: 142 MMOL/L
T4 FREE SERPL-MCNC: 1 NG/DL
TRIGL SERPL-MCNC: 84 MG/DL
TSH SERPL-ACNC: 2.18 UIU/ML
WBC # FLD AUTO: 4.44 K/UL

## 2023-01-25 PROCEDURE — 99214 OFFICE O/P EST MOD 30 MIN: CPT | Mod: GC

## 2023-01-25 RX ADMIN — MOMETASONE 0 MCG/ACT: 50 SPRAY, METERED NASAL at 00:00

## 2023-01-25 NOTE — ASSESSMENT
[FreeTextEntry1] : Assessment\par Encounter for follow up 2 weeks after COVID infection \par \par #Runny nose 2 weeks post COVID (Jan 2023)\par - Reassurance \par - nasonex spray \par \par # Palpitations, resolved \par # H/o APC/PVC\par - Echo in 2020 normal\par - NM stress test 12/2021 showed normal perfusion\par - Nuclear stress test 8/10/22 normal \par - CT heart 8/9/22 normal \par - TSH wnl\par - palpitations completely resolved 9/2022\par - C/w aspirin\par - C/w Atarax 25 mg qhs\par \par #B/l waist pain, unresolved \par - daily waist pain, L > R, worse with flexion; improved prior with PT\par -most likely musculoskeletal\par -continue Tylenol PRN \par \par # Insomnia\par - continue behavioral changes such as walking and improved diet\par - C/w Atarax 25m po qd\par \par # Shoulder Pain, resolved\par - XR R shoulder: no fracture/osseous abnormality; mild degenerative changes of right glenohumeral and AC joint \par - Tylenol as needed\par \par # HTN\par - /78 today\par - Continue amlodipine 5 mg QD\par \par # HLD \par - lipid panel 6/2022: TG 84, , HDL 53\par - discontinue pravastatin ( Calcium score: 0)\par \par # HCM\par - Lipid profile , A1c WNL as of Jun 2022\par - Repeat blood work next year\par - Mammogram January 2022: birads 1\par - Colonoscopy in 2019 at [facility] was normal; was told to f/u 2029\par - DEXA scan Feb 2022 normal\par - flu vaccine given Nov 2022\par - COVID booster given January 2022\par - discuss pneumococcal at next visit\par \par

## 2023-01-25 NOTE — PHYSICAL EXAM
[No Acute Distress] : no acute distress [Well Nourished] : well nourished [Normal Outer Ear/Nose] : the outer ears and nose were normal in appearance [No JVD] : no jugular venous distention [No Lymphadenopathy] : no lymphadenopathy [No Respiratory Distress] : no respiratory distress  [Normal Rate] : normal rate  [Regular Rhythm] : with a regular rhythm [No Carotid Bruits] : no carotid bruits [No CVA Tenderness] : no CVA  tenderness [No Spinal Tenderness] : no spinal tenderness [No Rash] : no rash

## 2023-01-25 NOTE — HISTORY OF PRESENT ILLNESS
[FreeTextEntry1] : follow up  [de-identified] : Pt is a 73 y/o F with PMHx of HTN, HLD, PVCs, arthritis, L eye glaucoma presents for f/u. She reports she had COVID infection 2 weeks ago and went to urgent care where they gave her an unknown medication for 5 days. At the time, she just had runny nose, slight cough, and headache. No fever. No chest pain/ SOB. Now, she reports still having runny nose and sometimes reports feeling cold at night. Occasionally she has chest pain that lasts for seconds and resolves on its own. No other complaints.

## 2023-01-25 NOTE — REVIEW OF SYSTEMS
[Nasal Discharge] : nasal discharge [Fever] : no fever [Chills] : no chills [Earache] : no earache [Hearing Loss] : no hearing loss [Sore Throat] : no sore throat [Chest Pain] : no chest pain [Palpitations] : no palpitations [Orthopnea] : no orthopnea [Shortness Of Breath] : no shortness of breath [Wheezing] : no wheezing [Nausea] : no nausea [Abdominal Pain] : no abdominal pain [Dysuria] : no dysuria [Hematuria] : no hematuria [Itching] : no itching [Skin Rash] : no skin rash [Headache] : no headache [Dizziness] : no dizziness

## 2023-01-26 DIAGNOSIS — E55.9 VITAMIN D DEFICIENCY, UNSPECIFIED: ICD-10-CM

## 2023-01-26 DIAGNOSIS — Z86.16 PERSONAL HISTORY OF COVID-19: ICD-10-CM

## 2023-01-26 DIAGNOSIS — Z23 ENCOUNTER FOR IMMUNIZATION: ICD-10-CM

## 2023-01-26 DIAGNOSIS — I10 ESSENTIAL (PRIMARY) HYPERTENSION: ICD-10-CM

## 2023-02-06 ENCOUNTER — APPOINTMENT (OUTPATIENT)
Dept: UROGYNECOLOGY | Facility: CLINIC | Age: 73
End: 2023-02-06
Payer: MEDICAID

## 2023-02-06 VITALS
HEART RATE: 79 BPM | HEIGHT: 61 IN | WEIGHT: 154 LBS | SYSTOLIC BLOOD PRESSURE: 96 MMHG | BODY MASS INDEX: 29.07 KG/M2 | DIASTOLIC BLOOD PRESSURE: 58 MMHG

## 2023-02-06 DIAGNOSIS — N39.41 URGE INCONTINENCE: ICD-10-CM

## 2023-02-06 PROCEDURE — 51701 INSERT BLADDER CATHETER: CPT

## 2023-02-06 PROCEDURE — 99214 OFFICE O/P EST MOD 30 MIN: CPT | Mod: 25

## 2023-02-06 RX ORDER — PRAVASTATIN SODIUM 10 MG/1
10 TABLET ORAL DAILY
Qty: 90 | Refills: 3 | Status: COMPLETED | COMMUNITY
Start: 2021-12-08 | End: 2023-02-06

## 2023-02-07 NOTE — PHYSICAL EXAM
[Chaperone Present] : A chaperone was present in the examining room during all aspects of the physical examination [No Acute Distress] : in no acute distress [Well developed] : well developed [Well Nourished] : ~L well nourished [FreeTextEntry1] : Indication: Urge Incontinence\par Urethra was prepped in sterile fashion and then a sterile non- indwelling catheter (14F) was used by me to drain the bladder. The patient tolerated the procedure well.\par cath: 260 cc

## 2023-02-07 NOTE — DISCUSSION/SUMMARY
[FreeTextEntry1] : \par Urge Incontinence-\par Patient happy with Gemtesa 75 mg\par Precautions reviewed.\par Will have RN Erika call in 2 weeks as patient does not want to change medications at this time and does now know if she wants to continue with this medication. If she has not seen improvement, can consider Myrbetriq or 3rd line procedures. Patient does not want to try anticholinergics due to concerns of memory loss. \par \par Urine culture obtained.\par Will follow up.\par Will treat accordingly if necessary\par Advised to try azo prn\par

## 2023-02-07 NOTE — HISTORY OF PRESENT ILLNESS
[FreeTextEntry1] : Patient is here for 1 months med check for urge incontinence.\par Last seen on 1/5/2023 for med check.\par \par + likely suburethral varicosity about 2cm proximal to urethral meatus, notender\par \par GH: 6 PB: 3 TVL: 8 C: -3 D: -5 Aa: +1 Ba: +1 Ap: -2 Bp: -2\par \par PVR 30 cc without reduction at new patient visit\par \par Gemtesa\par \par Today, patient states she is happy with Gemtesa and is noticing improvement. She has two weeks left and would like to complete using them before deciding if she wants to change medications. Denies side effects. Patient has been experiencing burning with urination for the past few days. \par \par \par

## 2023-02-07 NOTE — COUNSELING
[FreeTextEntry1] : \par If you feel like you have an infection it is important for you to call our office and we will arrange testing of your urine.\par \par Please continue taking Gemtesa 75 mg for frequency. \par \par We will contact you if the urine results are abnormal.\par \par You can take azo (as needed) for the discomfort. It turns your urine orange. \par \par Please call my office if you have any issues with the cost or side effects of the medication. \par \par The nurse will call you in one to two weeks to discuss the medications and we will schedule the next visit accordingly.\par

## 2023-02-10 LAB — URINE CULTURE <10: NORMAL

## 2023-03-01 ENCOUNTER — OUTPATIENT (OUTPATIENT)
Dept: OUTPATIENT SERVICES | Facility: HOSPITAL | Age: 73
LOS: 1 days | End: 2023-03-01
Payer: MEDICAID

## 2023-03-01 DIAGNOSIS — M25.551 PAIN IN RIGHT HIP: ICD-10-CM

## 2023-03-01 PROCEDURE — 97161 PT EVAL LOW COMPLEX 20 MIN: CPT | Mod: GP

## 2023-03-02 DIAGNOSIS — M25.551 PAIN IN RIGHT HIP: ICD-10-CM

## 2023-03-15 ENCOUNTER — APPOINTMENT (OUTPATIENT)
Dept: INTERNAL MEDICINE | Facility: CLINIC | Age: 73
End: 2023-03-15

## 2023-03-23 ENCOUNTER — EMERGENCY (EMERGENCY)
Facility: HOSPITAL | Age: 73
LOS: 0 days | Discharge: HOME | End: 2023-03-24
Attending: EMERGENCY MEDICINE
Payer: MEDICAID

## 2023-03-23 VITALS
DIASTOLIC BLOOD PRESSURE: 72 MMHG | WEIGHT: 145.06 LBS | TEMPERATURE: 98 F | OXYGEN SATURATION: 98 % | RESPIRATION RATE: 18 BRPM | SYSTOLIC BLOOD PRESSURE: 142 MMHG | HEART RATE: 74 BPM

## 2023-03-23 DIAGNOSIS — E78.5 HYPERLIPIDEMIA, UNSPECIFIED: ICD-10-CM

## 2023-03-23 DIAGNOSIS — R53.1 WEAKNESS: ICD-10-CM

## 2023-03-23 DIAGNOSIS — I10 ESSENTIAL (PRIMARY) HYPERTENSION: ICD-10-CM

## 2023-03-23 DIAGNOSIS — Z79.82 LONG TERM (CURRENT) USE OF ASPIRIN: ICD-10-CM

## 2023-03-23 DIAGNOSIS — M79.10 MYALGIA, UNSPECIFIED SITE: ICD-10-CM

## 2023-03-23 DIAGNOSIS — R42 DIZZINESS AND GIDDINESS: ICD-10-CM

## 2023-03-23 LAB
ALBUMIN SERPL ELPH-MCNC: 4.6 G/DL — SIGNIFICANT CHANGE UP (ref 3.5–5.2)
ALP SERPL-CCNC: 67 U/L — SIGNIFICANT CHANGE UP (ref 30–115)
ALT FLD-CCNC: 20 U/L — SIGNIFICANT CHANGE UP (ref 0–41)
ANION GAP SERPL CALC-SCNC: 12 MMOL/L — SIGNIFICANT CHANGE UP (ref 7–14)
APPEARANCE UR: CLEAR — SIGNIFICANT CHANGE UP
AST SERPL-CCNC: 33 U/L — SIGNIFICANT CHANGE UP (ref 0–41)
BASOPHILS # BLD AUTO: 0.03 K/UL — SIGNIFICANT CHANGE UP (ref 0–0.2)
BASOPHILS NFR BLD AUTO: 0.6 % — SIGNIFICANT CHANGE UP (ref 0–1)
BILIRUB SERPL-MCNC: 0.2 MG/DL — SIGNIFICANT CHANGE UP (ref 0.2–1.2)
BILIRUB UR-MCNC: NEGATIVE — SIGNIFICANT CHANGE UP
BUN SERPL-MCNC: 15 MG/DL — SIGNIFICANT CHANGE UP (ref 10–20)
CALCIUM SERPL-MCNC: 9.8 MG/DL — SIGNIFICANT CHANGE UP (ref 8.4–10.5)
CHLORIDE SERPL-SCNC: 103 MMOL/L — SIGNIFICANT CHANGE UP (ref 98–110)
CO2 SERPL-SCNC: 23 MMOL/L — SIGNIFICANT CHANGE UP (ref 17–32)
COLOR SPEC: SIGNIFICANT CHANGE UP
CREAT SERPL-MCNC: 0.7 MG/DL — SIGNIFICANT CHANGE UP (ref 0.7–1.5)
DIFF PNL FLD: NEGATIVE — SIGNIFICANT CHANGE UP
EGFR: 93 ML/MIN/1.73M2 — SIGNIFICANT CHANGE UP
EOSINOPHIL # BLD AUTO: 0.08 K/UL — SIGNIFICANT CHANGE UP (ref 0–0.7)
EOSINOPHIL NFR BLD AUTO: 1.7 % — SIGNIFICANT CHANGE UP (ref 0–8)
FLUAV AG NPH QL: SIGNIFICANT CHANGE UP
FLUBV AG NPH QL: SIGNIFICANT CHANGE UP
GLUCOSE SERPL-MCNC: 96 MG/DL — SIGNIFICANT CHANGE UP (ref 70–99)
GLUCOSE UR QL: NEGATIVE — SIGNIFICANT CHANGE UP
HCT VFR BLD CALC: 37.1 % — SIGNIFICANT CHANGE UP (ref 37–47)
HGB BLD-MCNC: 12.3 G/DL — SIGNIFICANT CHANGE UP (ref 12–16)
IMM GRANULOCYTES NFR BLD AUTO: 0 % — LOW (ref 0.1–0.3)
KETONES UR-MCNC: NEGATIVE — SIGNIFICANT CHANGE UP
LEUKOCYTE ESTERASE UR-ACNC: NEGATIVE — SIGNIFICANT CHANGE UP
LYMPHOCYTES # BLD AUTO: 1.76 K/UL — SIGNIFICANT CHANGE UP (ref 1.2–3.4)
LYMPHOCYTES # BLD AUTO: 37.8 % — SIGNIFICANT CHANGE UP (ref 20.5–51.1)
MCHC RBC-ENTMCNC: 29.2 PG — SIGNIFICANT CHANGE UP (ref 27–31)
MCHC RBC-ENTMCNC: 33.2 G/DL — SIGNIFICANT CHANGE UP (ref 32–37)
MCV RBC AUTO: 88.1 FL — SIGNIFICANT CHANGE UP (ref 81–99)
MONOCYTES # BLD AUTO: 0.48 K/UL — SIGNIFICANT CHANGE UP (ref 0.1–0.6)
MONOCYTES NFR BLD AUTO: 10.3 % — HIGH (ref 1.7–9.3)
NEUTROPHILS # BLD AUTO: 2.31 K/UL — SIGNIFICANT CHANGE UP (ref 1.4–6.5)
NEUTROPHILS NFR BLD AUTO: 49.6 % — SIGNIFICANT CHANGE UP (ref 42.2–75.2)
NITRITE UR-MCNC: NEGATIVE — SIGNIFICANT CHANGE UP
NRBC # BLD: 0 /100 WBCS — SIGNIFICANT CHANGE UP (ref 0–0)
PH UR: 6 — SIGNIFICANT CHANGE UP (ref 5–8)
PLATELET # BLD AUTO: 245 K/UL — SIGNIFICANT CHANGE UP (ref 130–400)
POTASSIUM SERPL-MCNC: 4.7 MMOL/L — SIGNIFICANT CHANGE UP (ref 3.5–5)
POTASSIUM SERPL-SCNC: 4.7 MMOL/L — SIGNIFICANT CHANGE UP (ref 3.5–5)
PROT SERPL-MCNC: 8 G/DL — SIGNIFICANT CHANGE UP (ref 6–8)
PROT UR-MCNC: NEGATIVE — SIGNIFICANT CHANGE UP
RBC # BLD: 4.21 M/UL — SIGNIFICANT CHANGE UP (ref 4.2–5.4)
RBC # FLD: 12.9 % — SIGNIFICANT CHANGE UP (ref 11.5–14.5)
RSV RNA NPH QL NAA+NON-PROBE: SIGNIFICANT CHANGE UP
SARS-COV-2 RNA SPEC QL NAA+PROBE: SIGNIFICANT CHANGE UP
SODIUM SERPL-SCNC: 138 MMOL/L — SIGNIFICANT CHANGE UP (ref 135–146)
SP GR SPEC: 1.01 — SIGNIFICANT CHANGE UP (ref 1.01–1.03)
TROPONIN T SERPL-MCNC: <0.01 NG/ML — SIGNIFICANT CHANGE UP
UROBILINOGEN FLD QL: SIGNIFICANT CHANGE UP
WBC # BLD: 4.66 K/UL — LOW (ref 4.8–10.8)
WBC # FLD AUTO: 4.66 K/UL — LOW (ref 4.8–10.8)

## 2023-03-23 PROCEDURE — 85025 COMPLETE CBC W/AUTO DIFF WBC: CPT

## 2023-03-23 PROCEDURE — 36415 COLL VENOUS BLD VENIPUNCTURE: CPT

## 2023-03-23 PROCEDURE — 99223 1ST HOSP IP/OBS HIGH 75: CPT

## 2023-03-23 PROCEDURE — 84484 ASSAY OF TROPONIN QUANT: CPT

## 2023-03-23 PROCEDURE — 0241U: CPT

## 2023-03-23 PROCEDURE — 93005 ELECTROCARDIOGRAM TRACING: CPT

## 2023-03-23 PROCEDURE — 87086 URINE CULTURE/COLONY COUNT: CPT

## 2023-03-23 PROCEDURE — 99285 EMERGENCY DEPT VISIT HI MDM: CPT | Mod: 25

## 2023-03-23 PROCEDURE — 80053 COMPREHEN METABOLIC PANEL: CPT

## 2023-03-23 PROCEDURE — 70450 CT HEAD/BRAIN W/O DYE: CPT | Mod: MA

## 2023-03-23 PROCEDURE — 70450 CT HEAD/BRAIN W/O DYE: CPT | Mod: 26,MA

## 2023-03-23 PROCEDURE — 81003 URINALYSIS AUTO W/O SCOPE: CPT

## 2023-03-23 PROCEDURE — 93010 ELECTROCARDIOGRAM REPORT: CPT

## 2023-03-23 PROCEDURE — 96374 THER/PROPH/DIAG INJ IV PUSH: CPT

## 2023-03-23 PROCEDURE — 71045 X-RAY EXAM CHEST 1 VIEW: CPT | Mod: 26

## 2023-03-23 PROCEDURE — 93306 TTE W/DOPPLER COMPLETE: CPT

## 2023-03-23 PROCEDURE — 71045 X-RAY EXAM CHEST 1 VIEW: CPT

## 2023-03-23 PROCEDURE — G0378: CPT

## 2023-03-23 RX ORDER — MECLIZINE HCL 12.5 MG
50 TABLET ORAL ONCE
Refills: 0 | Status: COMPLETED | OUTPATIENT
Start: 2023-03-23 | End: 2023-03-23

## 2023-03-23 RX ORDER — ONDANSETRON 8 MG/1
4 TABLET, FILM COATED ORAL ONCE
Refills: 0 | Status: COMPLETED | OUTPATIENT
Start: 2023-03-23 | End: 2023-03-23

## 2023-03-23 RX ORDER — SODIUM CHLORIDE 9 MG/ML
1000 INJECTION, SOLUTION INTRAVENOUS ONCE
Refills: 0 | Status: COMPLETED | OUTPATIENT
Start: 2023-03-23 | End: 2023-03-23

## 2023-03-23 RX ADMIN — ONDANSETRON 4 MILLIGRAM(S): 8 TABLET, FILM COATED ORAL at 17:42

## 2023-03-23 RX ADMIN — Medication 50 MILLIGRAM(S): at 17:41

## 2023-03-23 RX ADMIN — SODIUM CHLORIDE 1000 MILLILITER(S): 9 INJECTION, SOLUTION INTRAVENOUS at 17:43

## 2023-03-23 NOTE — ED ADULT TRIAGE NOTE - CHIEF COMPLAINT QUOTE
Patient a+ox3 co "feeling dizzy on and off, body aches, generalized weakness and headache" X 3 days-denies fever

## 2023-03-23 NOTE — ED ADULT NURSE NOTE - OBJECTIVE STATEMENT
72 year old female complaining of chills, body aches, headache, and weakness "for the last few days." Pt denies fever, n/v/d, chest pain, sob

## 2023-03-23 NOTE — ED PROVIDER NOTE - OBJECTIVE STATEMENT
72 female history of HLD, HTN presents to ED for 3 days of weakness with associated dizziness and generalized body aches.  Patient states that she woke up with the symptoms got worse over the last few days.  Patient that she feels she has no energy.  Denies CP, SOB, N, V, fever, chills, back pain, numbness/tingling to extremities.  No recent sick contacts

## 2023-03-23 NOTE — ED PROVIDER NOTE - PHYSICAL EXAMINATION
VITAL SIGNS: I have reviewed nursing notes and confirm.  CONSTITUTIONAL: in no acute distress.  SKIN: Skin exam is warm and dry, no acute rash.  HEAD: Normocephalic; atraumatic.  EYES: PERRL, EOM intact; conjunctiva and sclera clear.  ENT: No nasal discharge; airway clear.   NECK: Supple; non tender.  CARD: S1, S2 normal; no murmurs, gallops, or rubs. Regular rate and rhythm.  RESP: No wheezes, rales or rhonchi. Speaking in full sentences.   ABD: Normal bowel sounds; soft; non-distended; non-tender; No rebound or guarding. No CVA tenderness.  EXT: Normal ROM. No clubbing, cyanosis or edema.  NEURO: Alert, oriented. Grossly unremarkable. No focal deficits.

## 2023-03-23 NOTE — ED ADULT NURSE NOTE - NSIMPLEMENTINTERV_GEN_ALL_ED
Implemented All Fall Risk Interventions:  [place] to call system. Call bell, personal items and telephone within reach. Instruct patient to call for assistance. Room bathroom lighting operational. Non-slip footwear when patient is off stretcher. Physically safe environment: no spills, clutter or unnecessary equipment. Stretcher in lowest position, wheels locked, appropriate side rails in place. Provide visual cue, wrist band, yellow gown, etc. Monitor gait and stability. Monitor for mental status changes and reorient to person, place, and time. Review medications for side effects contributing to fall risk. Reinforce activity limits and safety measures with patient and family.

## 2023-03-23 NOTE — ED PROVIDER NOTE - CLINICAL SUMMARY MEDICAL DECISION MAKING FREE TEXT BOX
72-year-old female past medical history as above presents with weakness dizziness ANO x3 body aches dysuria no chest pain no shortness of breath no abdominal pain no back pain.  Well appearing, NAD, non toxic. NCAT PERRLA EOMI neck supple non tender normal wob cta bl rrr abdomen s nt nd no rebound no guarding WWPx4 neuro non focal.  Labs urine

## 2023-03-24 VITALS
RESPIRATION RATE: 18 BRPM | DIASTOLIC BLOOD PRESSURE: 60 MMHG | TEMPERATURE: 98 F | OXYGEN SATURATION: 100 % | SYSTOLIC BLOOD PRESSURE: 103 MMHG | HEART RATE: 66 BPM

## 2023-03-24 PROCEDURE — 99238 HOSP IP/OBS DSCHRG MGMT 30/<: CPT

## 2023-03-24 PROCEDURE — 93306 TTE W/DOPPLER COMPLETE: CPT | Mod: 26

## 2023-03-24 NOTE — ED CDU PROVIDER INITIAL DAY NOTE - CONSIDERATION OF ADMISSION OBSERVATION
Pt required telemetry, serial EKG/enzymes, advanced cardiac imaging Consideration of Admission/Observation

## 2023-03-24 NOTE — ED CDU PROVIDER INITIAL DAY NOTE - ATTENDING APP SHARED VISIT CONTRIBUTION OF CARE
72-year-old woman, history of hypertension, hyperlipidemia was placed in CDU for echo due to generalized weakness.  No hi chest pain.  ED work-up was unremarkable.  Vital signs, exam as noted.  Plan is for telemetry, serial EKG and enzymes, echo, dispo accordingly.

## 2023-03-24 NOTE — ED CDU PROVIDER DISPOSITION NOTE - PATIENT PORTAL LINK FT
You can access the FollowMyHealth Patient Portal offered by [facility] by registering at the following website: [email or web address]. By joining [facility]’s FollowMyHealth portal, you will also be able to view your health information using other applications (apps) compatible with our system.

## 2023-03-24 NOTE — ED CDU PROVIDER INITIAL DAY NOTE - CLINICAL SUMMARY MEDICAL DECISION MAKING FREE TEXT BOX
72-year-old woman, history of hypertension, hyperlipidemia was placed in CDU for echo due to generalized weakness.  No hi chest pain.  ED work-up was unremarkable.  Vital signs, exam as noted.  Patient was monitored without incident, repeat EKG and enzymes unchanged.  Echo revealed normal ejection fraction and mild valve disease, not significantly changed since 2020.  Patient reassured, feeling better and comfortable with discharge.  She will follow-up with PMD and return to the ED for persistent or worsening symptoms.

## 2023-03-25 LAB
CULTURE RESULTS: SIGNIFICANT CHANGE UP
SPECIMEN SOURCE: SIGNIFICANT CHANGE UP

## 2023-03-29 ENCOUNTER — APPOINTMENT (OUTPATIENT)
Dept: INTERNAL MEDICINE | Facility: CLINIC | Age: 73
End: 2023-03-29
Payer: MEDICAID

## 2023-03-29 ENCOUNTER — OUTPATIENT (OUTPATIENT)
Dept: OUTPATIENT SERVICES | Facility: HOSPITAL | Age: 73
LOS: 1 days | End: 2023-03-29
Payer: MEDICAID

## 2023-03-29 VITALS
HEIGHT: 61 IN | DIASTOLIC BLOOD PRESSURE: 82 MMHG | SYSTOLIC BLOOD PRESSURE: 127 MMHG | BODY MASS INDEX: 28.32 KG/M2 | OXYGEN SATURATION: 98 % | TEMPERATURE: 97 F | HEART RATE: 74 BPM | WEIGHT: 150 LBS

## 2023-03-29 DIAGNOSIS — Z00.00 ENCOUNTER FOR GENERAL ADULT MEDICAL EXAMINATION WITHOUT ABNORMAL FINDINGS: ICD-10-CM

## 2023-03-29 DIAGNOSIS — Z86.16 PERSONAL HISTORY OF COVID-19: ICD-10-CM

## 2023-03-29 DIAGNOSIS — G47.00 INSOMNIA, UNSPECIFIED: ICD-10-CM

## 2023-03-29 PROCEDURE — 99214 OFFICE O/P EST MOD 30 MIN: CPT | Mod: GC

## 2023-03-29 PROCEDURE — 99214 OFFICE O/P EST MOD 30 MIN: CPT

## 2023-03-29 NOTE — HISTORY OF PRESENT ILLNESS
[FreeTextEntry1] : ED follow up [de-identified] : Pt is a 73 y/o F with PMHx of HTN, HLD, PVCs, arthritis, L eye glaucoma here for ED follow up for weakness and "feeling cold". All ED labs/images were non-significant. Pt denies any other sx.\par

## 2023-03-29 NOTE — ASSESSMENT
[FreeTextEntry1] : #weakness\par - all ED images/labs stable\par - no concerning features, vitals stable\par \par # H/o APC/PVC\par - Nuclear stress test 8/10/22 normal \par - CT heart 8/9/22 normal \par - TSH wnl\par - palpitations completely resolved 9/2022\par - C/w aspirin\par - C/w Atarax 25 mg qhs\par \par #B/l waist pain, unresolved \par - daily waist pain, L > R, worse with flexion; improved prior with PT\par -most likely musculoskeletal\par -continue Tylenol PRN \par \par - pt has no medical contraindications to continue physical therapy\par \par \par # HTN\par - /82 today\par - Continue amlodipine 5 mg QD\par \par # HLD \par - lipid panel 6/2022: TG 84, , HDL 53\par - discontinue pravastatin ( Calcium score: 0)\par \par # HCM\par - Lipid profile , A1c WNL as of Jun 2022\par - Repeat blood work next year\par - Mammogram January 2022: birads 1\par - Colonoscopy in 2019 at [facility] was normal; was told to f/u 2029\par - DEXA scan Feb 2022 normal\par - flu vaccine given Nov 2022\par - COVID booster given January 2022\par - discuss pneumococcal at next visit\par

## 2023-03-30 DIAGNOSIS — I10 ESSENTIAL (PRIMARY) HYPERTENSION: ICD-10-CM

## 2023-03-30 DIAGNOSIS — Z86.16 PERSONAL HISTORY OF COVID-19: ICD-10-CM

## 2023-03-30 DIAGNOSIS — Z00.00 ENCOUNTER FOR GENERAL ADULT MEDICAL EXAMINATION WITHOUT ABNORMAL FINDINGS: ICD-10-CM

## 2023-03-30 DIAGNOSIS — G47.00 INSOMNIA, UNSPECIFIED: ICD-10-CM

## 2023-04-24 ENCOUNTER — OUTPATIENT (OUTPATIENT)
Dept: OUTPATIENT SERVICES | Facility: HOSPITAL | Age: 73
LOS: 1 days | End: 2023-04-24
Payer: MEDICAID

## 2023-04-24 DIAGNOSIS — M25.551 PAIN IN RIGHT HIP: ICD-10-CM

## 2023-04-24 PROCEDURE — 97110 THERAPEUTIC EXERCISES: CPT | Mod: GP

## 2023-04-26 ENCOUNTER — APPOINTMENT (OUTPATIENT)
Dept: INTERNAL MEDICINE | Facility: CLINIC | Age: 73
End: 2023-04-26

## 2023-05-01 ENCOUNTER — OUTPATIENT (OUTPATIENT)
Dept: OUTPATIENT SERVICES | Facility: HOSPITAL | Age: 73
LOS: 1 days | End: 2023-05-01
Payer: MEDICAID

## 2023-05-01 DIAGNOSIS — M25.551 PAIN IN RIGHT HIP: ICD-10-CM

## 2023-05-01 PROCEDURE — 97110 THERAPEUTIC EXERCISES: CPT | Mod: GP

## 2023-05-02 ENCOUNTER — RESULT REVIEW (OUTPATIENT)
Age: 73
End: 2023-05-02

## 2023-05-02 ENCOUNTER — OUTPATIENT (OUTPATIENT)
Dept: OUTPATIENT SERVICES | Facility: HOSPITAL | Age: 73
LOS: 1 days | End: 2023-05-02
Payer: MEDICAID

## 2023-05-02 DIAGNOSIS — Z12.31 ENCOUNTER FOR SCREENING MAMMOGRAM FOR MALIGNANT NEOPLASM OF BREAST: ICD-10-CM

## 2023-05-02 PROCEDURE — 77067 SCR MAMMO BI INCL CAD: CPT

## 2023-05-02 PROCEDURE — 77063 BREAST TOMOSYNTHESIS BI: CPT

## 2023-05-02 PROCEDURE — 77063 BREAST TOMOSYNTHESIS BI: CPT | Mod: 26

## 2023-05-02 PROCEDURE — 77067 SCR MAMMO BI INCL CAD: CPT | Mod: 26

## 2023-05-03 ENCOUNTER — OUTPATIENT (OUTPATIENT)
Dept: OUTPATIENT SERVICES | Facility: HOSPITAL | Age: 73
LOS: 1 days | End: 2023-05-03

## 2023-05-03 DIAGNOSIS — M25.551 PAIN IN RIGHT HIP: ICD-10-CM

## 2023-05-03 DIAGNOSIS — Z12.31 ENCOUNTER FOR SCREENING MAMMOGRAM FOR MALIGNANT NEOPLASM OF BREAST: ICD-10-CM

## 2023-05-08 ENCOUNTER — APPOINTMENT (OUTPATIENT)
Dept: INTERNAL MEDICINE | Facility: CLINIC | Age: 73
End: 2023-05-08

## 2023-05-08 ENCOUNTER — APPOINTMENT (OUTPATIENT)
Dept: INTERNAL MEDICINE | Facility: CLINIC | Age: 73
End: 2023-05-08
Payer: MEDICAID

## 2023-05-08 ENCOUNTER — NON-APPOINTMENT (OUTPATIENT)
Age: 73
End: 2023-05-08

## 2023-05-08 ENCOUNTER — OUTPATIENT (OUTPATIENT)
Dept: OUTPATIENT SERVICES | Facility: HOSPITAL | Age: 73
LOS: 1 days | End: 2023-05-08
Payer: MEDICAID

## 2023-05-08 ENCOUNTER — OUTPATIENT (OUTPATIENT)
Dept: OUTPATIENT SERVICES | Facility: HOSPITAL | Age: 73
LOS: 1 days | End: 2023-05-08

## 2023-05-08 VITALS
BODY MASS INDEX: 28.13 KG/M2 | OXYGEN SATURATION: 99 % | HEART RATE: 74 BPM | HEIGHT: 61 IN | TEMPERATURE: 97.7 F | WEIGHT: 149 LBS | SYSTOLIC BLOOD PRESSURE: 101 MMHG | DIASTOLIC BLOOD PRESSURE: 62 MMHG

## 2023-05-08 DIAGNOSIS — I10 ESSENTIAL (PRIMARY) HYPERTENSION: ICD-10-CM

## 2023-05-08 DIAGNOSIS — Z23 ENCOUNTER FOR IMMUNIZATION: ICD-10-CM

## 2023-05-08 DIAGNOSIS — R00.2 PALPITATIONS: ICD-10-CM

## 2023-05-08 DIAGNOSIS — M25.551 PAIN IN RIGHT HIP: ICD-10-CM

## 2023-05-08 DIAGNOSIS — M19.90 UNSPECIFIED OSTEOARTHRITIS, UNSPECIFIED SITE: ICD-10-CM

## 2023-05-08 DIAGNOSIS — Z00.00 ENCOUNTER FOR GENERAL ADULT MEDICAL EXAMINATION WITHOUT ABNORMAL FINDINGS: ICD-10-CM

## 2023-05-08 DIAGNOSIS — E78.5 HYPERLIPIDEMIA, UNSPECIFIED: ICD-10-CM

## 2023-05-08 DIAGNOSIS — I49.3 VENTRICULAR PREMATURE DEPOLARIZATION: ICD-10-CM

## 2023-05-08 DIAGNOSIS — Z00.00 ENCOUNTER FOR GENERAL ADULT MEDICAL EXAMINATION W/OUT ABNORMAL FINDINGS: ICD-10-CM

## 2023-05-08 PROCEDURE — 99214 OFFICE O/P EST MOD 30 MIN: CPT | Mod: 25

## 2023-05-08 PROCEDURE — 73502 X-RAY EXAM HIP UNI 2-3 VIEWS: CPT | Mod: 26,LT

## 2023-05-08 PROCEDURE — 90677 PCV20 VACCINE IM: CPT

## 2023-05-08 PROCEDURE — 90471 IMMUNIZATION ADMIN: CPT

## 2023-05-08 PROCEDURE — ZZZZZ: CPT

## 2023-05-08 PROCEDURE — 73502 X-RAY EXAM HIP UNI 2-3 VIEWS: CPT | Mod: LT

## 2023-05-08 RX ORDER — VIBEGRON 75 MG/1
75 TABLET, FILM COATED ORAL
Qty: 30 | Refills: 5 | Status: ACTIVE | COMMUNITY
Start: 2022-11-11 | End: 1900-01-01

## 2023-05-08 RX ORDER — AMLODIPINE BESYLATE 5 MG/1
5 TABLET ORAL DAILY
Qty: 90 | Refills: 3 | Status: ACTIVE | COMMUNITY
Start: 2021-12-08 | End: 1900-01-01

## 2023-05-08 RX ORDER — ASPIRIN 81 MG/1
81 TABLET, CHEWABLE ORAL DAILY
Qty: 90 | Refills: 3 | Status: ACTIVE | COMMUNITY
Start: 2021-12-08 | End: 1900-01-01

## 2023-05-08 RX ORDER — HYDROXYZINE HYDROCHLORIDE 25 MG/1
25 TABLET ORAL
Qty: 90 | Refills: 3 | Status: ACTIVE | COMMUNITY
Start: 2022-08-17 | End: 1900-01-01

## 2023-05-08 NOTE — PHYSICAL EXAM
[No Acute Distress] : no acute distress [Well Nourished] : well nourished [Well Developed] : well developed [Normal Sclera/Conjunctiva] : normal sclera/conjunctiva [Normal Outer Ear/Nose] : the outer ears and nose were normal in appearance [No JVD] : no jugular venous distention [No Respiratory Distress] : no respiratory distress  [No Accessory Muscle Use] : no accessory muscle use [Normal Rate] : normal rate  [No Carotid Bruits] : no carotid bruits [No Edema] : there was no peripheral edema [Soft] : abdomen soft [Non Tender] : non-tender [Non-distended] : non-distended [No Masses] : no abdominal mass palpated [No HSM] : no HSM [Normal Bowel Sounds] : normal bowel sounds

## 2023-05-09 DIAGNOSIS — M25.551 PAIN IN RIGHT HIP: ICD-10-CM

## 2023-05-10 ENCOUNTER — OUTPATIENT (OUTPATIENT)
Dept: OUTPATIENT SERVICES | Facility: HOSPITAL | Age: 73
LOS: 1 days | End: 2023-05-10

## 2023-05-10 DIAGNOSIS — M25.551 PAIN IN RIGHT HIP: ICD-10-CM

## 2023-05-10 NOTE — HISTORY OF PRESENT ILLNESS
[FreeTextEntry1] : Follow up visit  [de-identified] : 74 y/o F with PMHx of HTN, HLD, PVCs, arthritis, L eye glaucoma here for follow up visit. Patient denies any complaints or concerns. Patient states that she will be travelling and is here for medication refills.

## 2023-05-10 NOTE — REVIEW OF SYSTEMS
[Fever] : no fever [Chills] : no chills [Fatigue] : no fatigue [Discharge] : no discharge [Earache] : no earache [Chest Pain] : no chest pain [Palpitations] : no palpitations [Leg Claudication] : no leg claudication [Lower Ext Edema] : no lower extremity edema [Orthopnea] : no orthopnea [Paroxysmal Nocturnal Dyspnea] : no paroxysmal nocturnal dyspnea [Shortness Of Breath] : no shortness of breath [Wheezing] : no wheezing [Cough] : no cough [Dyspnea on Exertion] : no dyspnea on exertion [Abdominal Pain] : no abdominal pain [Nausea] : no nausea [Constipation] : no constipation [Diarrhea] : diarrhea [Vomiting] : no vomiting [Heartburn] : no heartburn [Melena] : no melena [Dysuria] : no dysuria [Incontinence] : no incontinence [Hematuria] : no hematuria [Frequency] : no frequency [Joint Pain] : no joint pain [Itching] : no itching [Headache] : no headache

## 2023-05-11 ENCOUNTER — OUTPATIENT (OUTPATIENT)
Dept: OUTPATIENT SERVICES | Facility: HOSPITAL | Age: 73
LOS: 1 days | End: 2023-05-11
Payer: MEDICAID

## 2023-05-11 DIAGNOSIS — Z00.00 ENCOUNTER FOR GENERAL ADULT MEDICAL EXAMINATION WITHOUT ABNORMAL FINDINGS: ICD-10-CM

## 2023-05-11 DIAGNOSIS — I10 ESSENTIAL (PRIMARY) HYPERTENSION: ICD-10-CM

## 2023-05-11 DIAGNOSIS — M19.90 UNSPECIFIED OSTEOARTHRITIS, UNSPECIFIED SITE: ICD-10-CM

## 2023-05-11 DIAGNOSIS — I49.3 VENTRICULAR PREMATURE DEPOLARIZATION: ICD-10-CM

## 2023-05-11 DIAGNOSIS — R00.2 PALPITATIONS: ICD-10-CM

## 2023-05-11 DIAGNOSIS — Z23 ENCOUNTER FOR IMMUNIZATION: ICD-10-CM

## 2023-05-11 DIAGNOSIS — E78.5 HYPERLIPIDEMIA, UNSPECIFIED: ICD-10-CM

## 2023-05-11 PROCEDURE — 82306 VITAMIN D 25 HYDROXY: CPT

## 2023-05-11 PROCEDURE — 83036 HEMOGLOBIN GLYCOSYLATED A1C: CPT

## 2023-05-11 PROCEDURE — 80061 LIPID PANEL: CPT

## 2023-05-11 PROCEDURE — 84443 ASSAY THYROID STIM HORMONE: CPT

## 2023-05-11 PROCEDURE — 85027 COMPLETE CBC AUTOMATED: CPT

## 2023-05-11 PROCEDURE — 80053 COMPREHEN METABOLIC PANEL: CPT

## 2023-05-17 PROBLEM — Z00.00 ENCOUNTER FOR PREVENTIVE HEALTH EXAMINATION: Status: ACTIVE | Noted: 2023-05-17

## 2023-05-17 LAB
25(OH)D3 SERPL-MCNC: 48 NG/ML
ALBUMIN SERPL ELPH-MCNC: 4.7 G/DL
ALP BLD-CCNC: 69 U/L
ALT SERPL-CCNC: 19 U/L
ANION GAP SERPL CALC-SCNC: 13 MMOL/L
AST SERPL-CCNC: 25 U/L
BASOPHILS # BLD AUTO: 0.04 K/UL
BASOPHILS NFR BLD AUTO: 1.1 %
BILIRUB SERPL-MCNC: 0.4 MG/DL
BUN SERPL-MCNC: 17 MG/DL
CALCIUM SERPL-MCNC: 9.7 MG/DL
CHLORIDE SERPL-SCNC: 104 MMOL/L
CHOLEST SERPL-MCNC: 204 MG/DL
CO2 SERPL-SCNC: 22 MMOL/L
CREAT SERPL-MCNC: 0.8 MG/DL
EGFR: 79 ML/MIN/1.73M2
EOSINOPHIL # BLD AUTO: 0.07 K/UL
EOSINOPHIL NFR BLD AUTO: 2 %
ESTIMATED AVERAGE GLUCOSE: 94 MG/DL
GLUCOSE SERPL-MCNC: 83 MG/DL
HBA1C MFR BLD HPLC: 4.9 %
HCT VFR BLD CALC: 37.4 %
HDLC SERPL-MCNC: 57 MG/DL
HGB BLD-MCNC: 12.2 G/DL
IMM GRANULOCYTES NFR BLD AUTO: 0.3 %
LDLC SERPL CALC-MCNC: 132 MG/DL
LYMPHOCYTES # BLD AUTO: 1.45 K/UL
LYMPHOCYTES NFR BLD AUTO: 40.5 %
MAN DIFF?: NORMAL
MCHC RBC-ENTMCNC: 29.7 PG
MCHC RBC-ENTMCNC: 32.6 G/DL
MCV RBC AUTO: 91 FL
MONOCYTES # BLD AUTO: 0.44 K/UL
MONOCYTES NFR BLD AUTO: 12.3 %
NEUTROPHILS # BLD AUTO: 1.57 K/UL
NEUTROPHILS NFR BLD AUTO: 43.8 %
NONHDLC SERPL-MCNC: 147 MG/DL
PLATELET # BLD AUTO: 211 K/UL
POTASSIUM SERPL-SCNC: 4.4 MMOL/L
PROT SERPL-MCNC: 8 G/DL
RBC # BLD: 4.11 M/UL
RBC # FLD: 13 %
SODIUM SERPL-SCNC: 139 MMOL/L
TRIGL SERPL-MCNC: 77 MG/DL
TSH SERPL-ACNC: 2.22 UIU/ML
WBC # FLD AUTO: 3.58 K/UL

## 2023-08-16 ENCOUNTER — APPOINTMENT (OUTPATIENT)
Dept: UROGYNECOLOGY | Facility: CLINIC | Age: 73
End: 2023-08-16

## 2023-09-05 NOTE — ED CDU PROVIDER DISPOSITION NOTE - NS ED MD DISPO DISCHARGE
HPI: Karla is a 60 year old female who presents today for annual gyn exam. Medical and surgical history reviewed and updated as appropriate. Medical history significant for diabetes, high blood pressure, and high cholesterol. Medication list reviewed and updated as appropriate. The patient is feeling well today. She denies vaginal bleeding, pelvic pain, discharge, itching, and odor. She declined the  today.    Menstrual History  Menopause: Yes  Last Pap: 4/28/21, Normal  History of abnormal Pap: No  Colposcopy: No  LEEP: No  Hx of STI: No    /Sexual function  Sexually active: Yes, one partner.  Contraception: S/P menopause  Pain with intercourse: No  Bleeding with intercourse: No  Issues with lubrication, arousal: No    Preventative Care:  Last Mammogram: 11/9/22, Benign   Hx of Abnormal Mammo: No  Last colonoscopy: Yes, 10/20/21, polyps per pt.     Family History:  Breast Cancer: Sister, unsure of age at diagnosis  Ovarian Cancer: No  Uterine Cancer: No  Cervical Cancer:No  Colon Cancer: No  Pancreatic Cancer:No    Social:  Smoking: No  Vaping: No  Drugs: No  Alcohol: Yes, socially.     Vaccinations:  Influenza: Yes, she had one last year and she will get one this year.  Covid-19: Yes, moderna, 2 doses plus a booster.     Diet: She eats healthy. She cooks at home- has lean proteins and vegetables.  Exercise: She states she is going to start going to [employer]. this Saturday and she does walk sometimes.     Stress/Anxiety/Depression: She states she has anxiety- takes medication and states that it helps.     DEPRESSION:  Recent PHQ 2/9 Score    PHQ 2:  PHQ 2 Score Adult PHQ 2 Score Adult PHQ 2 Interpretation Little interest or pleasure in activity?   4/28/2021   9:50 AM 0 No further screening needed 0       Past Medical History:   Diagnosis Date   • Anxiety    • Depression    • Diabetes mellitus (CMD)     iddm   • Essential (primary) hypertension    • High cholesterol    • Vertigo        Past  Surgical History:   Procedure Laterality Date   • Appendectomy     •  section, classic     •  section, low transverse      x2 surgeries    • Other lithotripsy     • Removal gallbladder         Current Outpatient Medications   Medication Sig Dispense Refill   • atorvastatin (LIPITOR) 10 MG tablet Take 1 tablet by mouth daily.     • latanoprost (XALATAN) 0.005 % ophthalmic solution INSTILL 1 DROP IN RIGHT EYE EVERY EVENING     • losartan (COZAAR) 50 MG tablet Take 1 tablet by mouth daily.     • dorzolamide-timolol (COSOPT) 22.3-6.8 MG/ML ophthalmic solution Apply 1 drop to eye.     • Ertugliflozin L-PyroglutamicAc 15 MG Tab Take 1 tablet by mouth daily.     • Pseudoephedrine-APAP-DM (DAYQUIL MULTI-SYMPTOM PO)      • acetaminophen (TYLENOL) 325 MG tablet Take 650 mg by mouth every 4 hours as needed for Pain.     • estradiol (ESTRACE) 0.1 MG/GM vaginal cream Apply pea sized amount to vulva at bedtime for 2 weeks, then twice weekly thereafter. 42.5 g 6   • brimonidine (ALPHAGAN) 0.2 % ophthalmic solution      • Lantus SoloStar 100 UNIT/ML pen-injector INJECT 30 UNITS UNDER THE SKIN DAILY     • B-D U/F PEN NEEDLE 31G X 5 MM Misc USE DAILY WITH INSULIN     • venlafaxine (EFFEXOR) 75 MG tablet Take 75 mg by mouth daily.      • metFORMIN (GLUCOPHAGE) 850 MG tablet Take 850 mg by mouth 2 times daily (with meals).        No current facility-administered medications for this visit.     ALLERGIES:  No Known Allergies    Social History     Tobacco Use   • Smoking status: Never   • Smokeless tobacco: Never   Substance Use Topics   • Alcohol use: No     Review Of Systems  Review of Systems   Gastrointestinal: Negative for abdominal distention, abdominal pain, constipation, diarrhea, nausea and vomiting.   Genitourinary: Negative for dyspareunia, pelvic pain, vaginal bleeding, vaginal discharge and vaginal pain.     OBJECTIVE:  Visit Vitals  /85   Pulse 88   Temp 97.3 °F (36.3 °C) (Temporal)   Wt 79.1 kg (174  lb 4.4 oz)   SpO2 98%   BMI 31.88 kg/m²     Physical Exam  Constitutional:       General: She is not in acute distress.     Appearance: Normal appearance. She is well-developed and well-groomed. She is obese. She is not ill-appearing, toxic-appearing or diaphoretic.   Genitourinary:      Vulva and rectum normal.      No lesions in the vagina.      Right Labia: No rash, tenderness, lesions, skin changes or Bartholin's cyst.     Left Labia: No tenderness, lesions, skin changes, Bartholin's cyst or rash.     No vaginal discharge, erythema, tenderness, bleeding or ulceration.      No vaginal prolapse present.     Mild vaginal atrophy present.       Right Adnexa: not tender and no mass present.     Left Adnexa: not tender and no mass present.     Adnexa exam comments: Pt denies tenderness to palpation bilaterally. No masses or enlargement felt bilaterally..      No cervical motion tenderness, discharge, friability, lesion, polyp, nabothian cyst or eversion.      Uterus is not enlarged, tender or prolapsed.      No uterine mass detected.     Uterus exam comments: She declines pain on internal exam..      Pelvic exam was performed with patient in the lithotomy position.   Breasts:     Right: No swelling, bleeding, inverted nipple, mass, nipple discharge, skin change, tenderness or breast implant.      Left: No swelling, bleeding, inverted nipple, mass, nipple discharge, skin change, tenderness or breast implant.      Breast exam comments: Breasts are symmetrical. Patient denies tenderness to palpation bilaterally. No lumps or masses felt bilaterally. Patient denies nipple discharge or bleeding..      HENT:      Head: Normocephalic.   Eyes:      General: Lids are normal.   Cardiovascular:      Rate and Rhythm: Normal rate and regular rhythm.      Heart sounds: Normal heart sounds, S1 normal and S2 normal. No murmur heard.  Pulmonary:      Effort: Pulmonary effort is normal. No respiratory distress.      Breath sounds:  Normal breath sounds and air entry. No stridor. No wheezing, rhonchi or rales.   Chest:      Chest wall: No tenderness.   Abdominal:      General: There is no distension.      Palpations: Abdomen is soft. There is no mass.      Tenderness: There is no abdominal tenderness. There is no guarding or rebound.      Hernia: No hernia is present.   Musculoskeletal:         General: Normal range of motion.      Cervical back: Normal range of motion.   Lymphadenopathy:      Upper Body:      Right upper body: No axillary adenopathy.      Left upper body: No axillary adenopathy.   Neurological:      Mental Status: She is alert and oriented to person, place, and time.   Skin:     General: Skin is warm.   Psychiatric:         Mood and Affect: Mood normal.         Behavior: Behavior normal. Behavior is cooperative.         Thought Content: Thought content normal.         Judgment: Judgment normal.   Vitals and nursing note reviewed.       Patient Education:  -Patient educated on eating a healthy diet that emphasizes lean proteins, whole grains, fruits, fiber rich vegetables, and healthy sources of calcium and vitamin D: milk, cheeses, yogurt.   -Patient counseled on exercise: at least 30 minutes of moderate intensity exercise, five days a week. Counseled on the importance of maintaining a healthy weight through diet and exercise  -Patient educated on the importance of staying up to date on routine vaccinations including the Flu vaccine and Covid-19 vaccine and boosters. Encouraged a flu vaccine in the fall.  -Educated on orders for preventative screenings per guidelines: mammogram order placed today     Plan:  Satisfactory well woman exam  Pap up to date per ASCCP guidelines- pt due next year.  Mammogram ordered per guidelines- patient knows she can call to schedule it but is due in November.  Colonoscopy up to date  Refills: Patient declines a refill of estrogen cream, states she will call us if she needs it.  Will notify  patient of results from today's tests via the patient portal or a phone call.  All patient questions and concerns addressed.    Problem List Items Addressed This Visit    None  Visit Diagnoses     Well woman exam with routine gynecological exam    -  Primary    Relevant Orders    MAMMO SCREENING BILATERAL W BLUE         F/U: Patient to follow-up in one year for well woman exam, or sooner if concerns arise.        Home

## 2023-09-20 DIAGNOSIS — Z00.00 ENCOUNTER FOR GENERAL ADULT MEDICAL EXAMINATION WITHOUT ABNORMAL FINDINGS: ICD-10-CM

## 2023-09-21 DIAGNOSIS — Z00.00 ENCOUNTER FOR GENERAL ADULT MEDICAL EXAMINATION WITHOUT ABNORMAL FINDINGS: ICD-10-CM

## 2023-11-08 ENCOUNTER — APPOINTMENT (OUTPATIENT)
Dept: INTERNAL MEDICINE | Facility: CLINIC | Age: 73
End: 2023-11-08

## 2024-04-05 NOTE — HISTORY OF PRESENT ILLNESS
Wound Consult:  new consult  Visit. Chart reviewed.  Consulted for post-op dressing change.  Spoke with patients nurse,  Alycia GARNETT.  Patient is resting on a keely bed with ERICH mattress.orientedX4, moves easily in bed  Assessment:  Left buttock- surgical debridement- [id number] opening for penrose drain with well approximated incision 6cm towards inner thigh, tunneling at 10 Oclock 4cm, 8O'clock- 8cm visible wound bed is red/pink,, penrose drain intact with sutures, no active drainage noted. Serosanguinous on dressings, no odor, painful. Slight surrounding blanching pink.    Treatment:  Left buttock- lightly irrigated with NSS, packed with 1 inch iodoform, 4x4 composite dressing.  Wound Recommendations:  Left buttock- lightly irrigate with NSS, pack with 1 inch iodoform focusing on tunneling to 8 O'clock and 4O'clock 4x4 composite dressing.  Plan:  Spoke with Dr. Gabriel regarding findings and proposed orders for treatment.  We will continue to reassess and as needed.  .Cecile Charles RN  [facility], Wound / Ostomy Department  Wound Healing Office [phone]     [FreeTextEntry1] : 71 F complains of pain in her heels, stating only when she is walking she first noticed it 1 year ago. \par Pain on her heels.

## 2024-06-13 NOTE — ED ADULT NURSE NOTE - NSSUHOSCREENINGYN_ED_ALL_ED
Gynecology Office Visit      Bertha Sy is a 45 year old female  Patient's last menstrual period was 01/15/2024 (approximate). (contraception:  vasectomy)     HPI:     Chief Complaint   Patient presents with    Other     Pt wishes to go over lab results from 24.        Collected 2024 12:39 PM       Status: Final result       Dx: Oligomenorrhea, unspecified type    0 Result Notes       1 Patient Communication      Component  Ref Range & Units 24 12:39 PM   Prolactin  No Established Reference Range for Females ng/mL 7.5         Latest Reference Range & Units 24 12:39   TSH 0.550 - 4.780 mIU/mL 2.413   Prolactin No Established Reference Range for Females ng/mL 7.5   Estradiol No established range for female sex pg/mL 35.6   FSH No established range for female sex mIU/mL 140.2   LH No established range for female sex mIU/mL 53.5       Chart and previous encounters reviewed.    HISTORY:  Past Medical History:    Anxiety      Past Surgical History:   Procedure Laterality Date    D & c      EAB    Other surgical history      SEPTOPLASTY      Family History   Problem Relation Age of Onset    Obesity Mother     High Cholesterol Mother     Other (Other) Mother         Stroke, thalmic pain syndrome    Heart Attack Father     Heart Surgery Father         PCI/stent    Hypertension Father     High Cholesterol Father     Heart Disease Father 55        MI    No Known Problems Sister     Lipids Brother     Psychiatric Brother         Depression    No Known Problems Brother     Ovarian Cancer Maternal Grandmother 90    Heart Disease Paternal Grandmother     Heart Disease Paternal Grandfather     Cancer Other         No family hx Breast/Ovarian/Colon Ca      Social History:   Social History     Socioeconomic History    Marital status:    Tobacco Use    Smoking status: Never    Smokeless tobacco: Never   Vaping Use    Vaping status: Never Used   Substance and Sexual Activity    Alcohol use: No     Drug use: No    Sexual activity: Not Currently     Partners: Male     Birth control/protection: Vasectomy        Medications (Active prior to today's visit):  Current Outpatient Medications   Medication Sig Dispense Refill    estrogens conjugated (PREMARIN) 1.25 MG Oral Tab Take 1 tablet (1.25 mg total) by mouth daily. 90 tablet 3    medroxyPROGESTERone Acetate 5 MG Oral Tab Take 1 tablet (5 mg total) by mouth daily. 90 tablet 3    sertraline 50 MG Oral Tab Take 1 tablet (50 mg total) by mouth daily. 90 tablet 0    Dextroamphetamine Sulfate 5 MG Oral Tab Take 1.5 tablets (7.5 mg total) by mouth 2 (two) times daily. 90 tablet 0    Dextroamphetamine Sulfate 5 MG Oral Tab Take 1.5 tablets (7.5 mg total) by mouth 2 (two) times daily. 90 tablet 0    Atomoxetine HCl (STRATTERA) 18 MG Oral Cap Take 1 capsule (18 mg total) by mouth 2 (two) times daily. 180 capsule 0    Dextroamphetamine Sulfate 5 MG Oral Tab Take 1.5 tablets (7.5 mg total) by mouth 2 (two) times daily. 90 tablet 0    Atomoxetine HCl (STRATTERA) 18 MG Oral Cap Take 1 capsule (18 mg total) by mouth 2 (two) times daily. 60 capsule 2    ALPRAZolam 0.5 MG Oral Tab Take 1 tablet (0.5 mg total) by mouth daily. As needed (Patient not taking: Reported on 2024) 30 tablet 2       Allergies:  No Known Allergies    Gyn:  Menarche: 16  Period Cycle (Days): 6 months  Period Duration (Days): 4 days  Use of Birth Control (if yes, specify type): Vasectomy  Pap Date: 21  Pap Result Notes: Neg/Neg; 2016 neg,neg  (2014 neg,neg  2012 neg,neg  2010 neg)  Follow Up Recommendation: due    OB Hx:  OB History    Para Term  AB Living   4 3 3   1 3   SAB IAB Ectopic Multiple Live Births         0 3      # Outcome Date GA Lbr Martinez/2nd Weight Sex Type Anes PTL Lv   4 Term 02/21/15 39w0d 15:11 / 00:51 6 lb 4.4 oz (2.845 kg) M NORMAL SPONT EPI N KJ   3 Term  40w0d  7 lb (3.175 kg) F NORMAL SPONT   KJ   2 Term 08 40w0d  6 lb 12 oz (3.062  kg) M NORMAL SPONT   KJ   1 AB 2001                 ROS:     10 point ROS completed and was negative, except for pertinent positive and negatives stated in the HPI.    PHYSICAL EXAM:   /84   Ht 66\"   Wt 137 lb (62.1 kg)   LMP 01/15/2024 (Approximate)   BMI 22.11 kg/m²      Wt Readings from Last 6 Encounters:   06/13/24 137 lb (62.1 kg)   06/14/23 137 lb (62.1 kg)   01/12/22 138 lb (62.6 kg)   07/22/21 134 lb (60.8 kg)   06/03/21 134 lb (60.8 kg)   01/06/21 136 lb (61.7 kg)        Gen:  Oriented, in no acute distress         ASSESSMENT/PLAN:     1. Premature menopause    2. Premature menopause on hormone replacement therapy  - estrogens conjugated (PREMARIN) 1.25 MG Oral Tab; Take 1 tablet (1.25 mg total) by mouth daily.  Dispense: 90 tablet; Refill: 3  - medroxyPROGESTERone Acetate 5 MG Oral Tab; Take 1 tablet (5 mg total) by mouth daily.  Dispense: 90 tablet; Refill: 3      Will follow [facility] recommendations for higher dose in her 40's       Counseled for 30 minutes on HRT less than 50      Menopause / HRT:      The women's health initiative prematurely reported some of their findings in 2001 to the press and left the country stunned by their preliminary findings suggesting that Hormone Replacement Therapy (HRT) had an increase risk of heartache, stroke, blood clot, and breast cancer without any increase in deaths.  10 years later and 1,700 articles from the same data we now know:    1.  Estrogen alone (ERT) has no increase risk of breast cancer  2.  No women under the age of 65 had an increase risk of heartache, stroke or blood clot - ONLY THE OLDER WOMEN (the reason you don't do press releases until you analyze the data)  3.  Only women taking both estrogen and progesterone (HRT) had an increase risk of breast cancer and only in year 4 (not 1,2,3 or 5?).  4.  ALL WOMEN THAT TAKE HORMONES LIVE LONGER THAN THOSE WHO DON'T.  5.  We are still under the same guidelines of only treating women with  symptoms with \"the lowest possible dose for the shortest possible duration, constantly weighing the risks versus the benefits\".  Symptoms are defined by  the the [employer] American Menopause Society (NAMS) as Mood Swings, night sweats, hot flashes, decrease libido, vaginal dryness, difficult concentrating, forgetfulness, depression, etc.      Lately we are getting away from \"natural\" and more to the synthetically derived smart estrogens - Selective Estrogen Receptor Modulators (SERM's). Still haven't found the perfect one but these stimulate some receptors and inhibit others.  The newest pairs Premarin with a SERM (Bazedoxifene - BZA) that mimics the effects of Tamoxifen without the hot flashes.  This drug actually shows a decrease in the incidence breast cancer in those women taking hormones who have a uterus.  BZA is marketed in [place] for the treatment of osteoporosis and THE PREVENTION OF BREAST CANCER!     NAMS 2022 Key Points - updated WHI 20 year follow up.    Lowest possible dose, shortest duration, only symptomatic women constantly weighing the risks vs benefits  Women 60 or younger or 10 years from menopause is key demographic - can continue if symptomatic  CAREFUL STARTING MORE THAN 10 YEARS AFTER MENOPAUSE - benefits less favorable  Absolute risks of HRT and ERT are very rare.  Having a glass of alcohol a day has same risk of breast cancer as HRT  Absolute risk of all forms of mortality, fracture, breast cancer, and diabetes all  less for women on HRT/ERT for women younger than 60  Increased risk of VTE, gal bladder, - CV and stroke in older patients only, prevention in younger patients    Four indications: VMS, prevention of osteoporosis, treatment of premature menopause, VV symptoms.     Nightly Micronized progesterone helps with hot flashes if E contraindicated.   Compounded bioidenticals safety concerns- only if allergic to FDA approved.     Micronized 18B estradiol is bioidentical as is micronized  progesterone    Treat women in premature menopause (Primary ovarian insufficiency) until 52 - longer if symptomatic  WHI does not apply to these women    Benefit to hair, skin, nails, collagen, can help with open-angle glaucoma    Helps prevent muscle waisting    Prevents dementia in women younger than 65, may increase risk in older women    Prevents CHD in younger women - reducing the risk of blood clot, heart attack and stroke - worsens in older women.     Less than one additional women with breast cancer  per 1,000 users annually = one glass of alcohol/day and less than two glasses of alcohol / day -ERT ONLY,   ERT - NO INCREASE IN BREAST CANCER RISK AND MAY REDUCE    DuaVee - no increased risk - probably reduces risk of breast cancer.     Ok in BRCA,  with family hx of breast cancer  and hx of most genital cancers except hormone receptor breast cancer for systemic hormones, vaginal Ok     Does not increase endometrial CA recurrence    OCP's significant reduction in ovarian cancer - persists for up to 30 years    HRT/ERT reduces risk of colon cancer and morality     Meds This Visit:  Requested Prescriptions     Signed Prescriptions Disp Refills    estrogens conjugated (PREMARIN) 1.25 MG Oral Tab 90 tablet 3     Sig: Take 1 tablet (1.25 mg total) by mouth daily.    medroxyPROGESTERone Acetate 5 MG Oral Tab 90 tablet 3     Sig: Take 1 tablet (5 mg total) by mouth daily.       Imaging & Referrals:  None     Return in about 3 months (around 9/13/2024) for Well Woman Exam.      Zach Brown MD  6/13/2024  4:43 PM         This note was created by [employer] voice recognition. Errors in content may be related to improper recognition by the system; efforts to review and correct have been done but errors may still exist. Please contact me with any questions.     Yes - the patient is able to be screened

## 2024-08-19 NOTE — ED CDU PROVIDER SUBSEQUENT DAY NOTE - ATTENDING APP SHARED VISIT CONTRIBUTION OF CARE
Case Management Discharge Note      Final Note: dc home with [facility]         Selected Continued Care - Discharged on 8/17/2024 Admission date: 8/16/2024 - Discharge disposition: Home-Health Care Svc      Destination    No services have been selected for the patient.                Durable Medical Equipment    No services have been selected for the patient.                Dialysis/Infusion    No services have been selected for the patient.                Home Medical Care Coordination complete.      Service Provider Selected Services Address Phone Fax Patient Preferred    [facility] [address] [address] [phone] --              Therapy    No services have been selected for the patient.                Community Resources    No services have been selected for the patient.                Community & DME    No services have been selected for the patient.                    Transportation Services  Private: Car    Final Discharge Disposition Code: 06 - home with home health care   72-year-old female PMH HTN, elevated cholesterol placed in the observation unit for chest pain work-up.  Patient reported that last night she felt anterior chest pressure/discomfort which prompted ED visit.  Pain was nonradiating, no clear  aggravating or alleviating factors, no associated dizziness /lightheadedness, shortness of breath, focal weakness/paresthesias or any other additional complaints.  Well-appearing well-nourished, NAD, head AT/NC, PERRL, pink conjunctivae,  mmm, nml oropharynx, nml phonation without drooling or trismus, supple neck without midline spine ttp, nml work of breathing, lungs CTA b/l, equal air entry, RRR, well-perfused extremities, distal pulses intact, abdomen soft, NT/ND, BS present in all quadrants, no midline spine or CVA ttp, no leg edema or unilateral calf swelling, A&Ox3, no focal neuro deficits, nml mood and affect.  Stress test ordered.

## 2024-10-16 NOTE — ED ADULT NURSE NOTE - HISTORY OF COVID-19 VACCINATION
Yes Render In Strict Bullet Format?: No Detail Level: Zone Plan: Use triamcinolone bid prn for no longer than 2 weeks at a time. Discussed strategies when swimming frequently ie rinsing after getting out of pool and applying thick emollients. Avoid irritating soaps. Patient's parent given brochure for Dupixent. Patient would be good candidate for long term management of atopic dermatitis with Dupixent. Recommend finishing any vaccine schedules prior to starting. Patient's mother will consider and RTC if they would like to start.